# Patient Record
Sex: MALE | Race: WHITE | NOT HISPANIC OR LATINO | Employment: FULL TIME | URBAN - METROPOLITAN AREA
[De-identification: names, ages, dates, MRNs, and addresses within clinical notes are randomized per-mention and may not be internally consistent; named-entity substitution may affect disease eponyms.]

---

## 2018-01-17 ENCOUNTER — APPOINTMENT (OUTPATIENT)
Dept: URGENT CARE | Facility: CLINIC | Age: 43
End: 2018-01-17

## 2018-01-17 PROCEDURE — G0382 LEV 3 HOSP TYPE B ED VISIT: HCPCS

## 2018-01-22 ENCOUNTER — APPOINTMENT (OUTPATIENT)
Dept: URGENT CARE | Facility: CLINIC | Age: 43
End: 2018-01-22
Payer: OTHER MISCELLANEOUS

## 2018-01-22 PROCEDURE — G0382 LEV 3 HOSP TYPE B ED VISIT: HCPCS

## 2018-01-31 ENCOUNTER — APPOINTMENT (OUTPATIENT)
Dept: URGENT CARE | Facility: CLINIC | Age: 43
End: 2018-01-31
Payer: OTHER MISCELLANEOUS

## 2018-01-31 PROCEDURE — 99203 OFFICE O/P NEW LOW 30 MIN: CPT

## 2018-08-30 ENCOUNTER — APPOINTMENT (OUTPATIENT)
Dept: URGENT CARE | Facility: CLINIC | Age: 43
End: 2018-08-30
Payer: OTHER MISCELLANEOUS

## 2018-08-30 ENCOUNTER — APPOINTMENT (OUTPATIENT)
Dept: RADIOLOGY | Facility: CLINIC | Age: 43
End: 2018-08-30
Payer: OTHER MISCELLANEOUS

## 2018-08-30 ENCOUNTER — TRANSCRIBE ORDERS (OUTPATIENT)
Dept: URGENT CARE | Facility: CLINIC | Age: 43
End: 2018-08-30

## 2018-08-30 DIAGNOSIS — S69.92XA INJURY OF LEFT HAND, INITIAL ENCOUNTER: Primary | ICD-10-CM

## 2018-08-30 DIAGNOSIS — M79.642 HAND PAIN, LEFT: ICD-10-CM

## 2018-08-30 DIAGNOSIS — M79.642 HAND PAIN, LEFT: Primary | ICD-10-CM

## 2018-08-30 PROCEDURE — 99283 EMERGENCY DEPT VISIT LOW MDM: CPT | Performed by: EMERGENCY MEDICINE

## 2018-08-30 PROCEDURE — 73130 X-RAY EXAM OF HAND: CPT

## 2018-08-30 PROCEDURE — G0382 LEV 3 HOSP TYPE B ED VISIT: HCPCS | Performed by: EMERGENCY MEDICINE

## 2018-09-07 ENCOUNTER — APPOINTMENT (OUTPATIENT)
Dept: URGENT CARE | Facility: CLINIC | Age: 43
End: 2018-09-07
Payer: OTHER MISCELLANEOUS

## 2018-09-07 PROCEDURE — 99213 OFFICE O/P EST LOW 20 MIN: CPT | Performed by: EMERGENCY MEDICINE

## 2018-09-21 ENCOUNTER — APPOINTMENT (OUTPATIENT)
Dept: URGENT CARE | Facility: CLINIC | Age: 43
End: 2018-09-21
Payer: OTHER MISCELLANEOUS

## 2018-09-21 PROCEDURE — 99213 OFFICE O/P EST LOW 20 MIN: CPT | Performed by: EMERGENCY MEDICINE

## 2018-09-28 ENCOUNTER — APPOINTMENT (OUTPATIENT)
Dept: URGENT CARE | Facility: CLINIC | Age: 43
End: 2018-09-28
Payer: OTHER MISCELLANEOUS

## 2018-09-28 PROCEDURE — 99213 OFFICE O/P EST LOW 20 MIN: CPT | Performed by: EMERGENCY MEDICINE

## 2018-10-05 ENCOUNTER — APPOINTMENT (OUTPATIENT)
Dept: RADIOLOGY | Facility: CLINIC | Age: 43
End: 2018-10-05
Payer: OTHER MISCELLANEOUS

## 2018-10-05 VITALS
HEIGHT: 68 IN | SYSTOLIC BLOOD PRESSURE: 158 MMHG | DIASTOLIC BLOOD PRESSURE: 94 MMHG | HEART RATE: 59 BPM | BODY MASS INDEX: 34.86 KG/M2 | WEIGHT: 230 LBS

## 2018-10-05 DIAGNOSIS — M77.8 LEFT HAND TENDONITIS: Primary | ICD-10-CM

## 2018-10-05 DIAGNOSIS — M79.642 HAND PAIN, LEFT: ICD-10-CM

## 2018-10-05 PROCEDURE — 73130 X-RAY EXAM OF HAND: CPT

## 2018-10-05 PROCEDURE — 99203 OFFICE O/P NEW LOW 30 MIN: CPT | Performed by: ORTHOPAEDIC SURGERY

## 2018-10-05 NOTE — LETTER
October 5, 2018     Patient: Lina Zazueta   YOB: 1975   Date of Visit: 10/5/2018       To Whom it May Concern: Lina Zazueta is under my professional care  He was seen in my office on 10/5/2018  He may return to work with out limitations  If you have any questions or concerns, please don't hesitate to call           Sincerely,          Tiarra Gomes MD        CC: No Recipients

## 2018-10-05 NOTE — PROGRESS NOTES
CHIEF COMPLAINT:  Chief Complaint   Patient presents with    Left Hand - Pain       SUBJECTIVE:  Virgilio Jean is a 36y o  year old RHD male who presents to the office after sustaining an injury to his left hand when he it was smashed between the dump truck and tore per while at work on 08/31/2018  Patient states that his pain is on the volar aspect of his hand proximal to his ring and long fingers, as well as on the radial and ulnar aspects of the same fingers  Patient has been seeing amelia, who currently have him on a 5 lb weight restriction  Patient has been seeing physical therapy  Today patient states he has no pain at rest   Patient states he only pain with gripping and squeezing of objects with his left hand  Patient states he has the most pain after working all day  Patient is currently not taking any medications for pain patient has been attending formal therapy  Patient denies previous injury  Patient denies numbness and tingling  PAST MEDICAL HISTORY:  History reviewed  No pertinent past medical history  PAST SURGICAL HISTORY:  History reviewed  No pertinent surgical history  FAMILY HISTORY:  History reviewed  No pertinent family history  SOCIAL HISTORY:  Social History   Substance Use Topics    Smoking status: Former Smoker    Smokeless tobacco: Never Used      Comment: quit 9 yrs ago    Alcohol use No       MEDICATIONS:  No current outpatient prescriptions on file  ALLERGIES:  No Known Allergies    REVIEW OF SYSTEMS:  Review of Systems   Constitutional: Negative for chills, fever and unexpected weight change  HENT: Negative for hearing loss, nosebleeds and sore throat  Eyes: Negative for pain, redness and visual disturbance  Respiratory: Negative for cough, shortness of breath and wheezing  Cardiovascular: Negative for chest pain, palpitations and leg swelling  Gastrointestinal: Negative for abdominal pain, nausea and vomiting     Endocrine: Negative for polydipsia and polyuria  Genitourinary: Negative for dysuria and hematuria  Musculoskeletal: Negative for arthralgias, joint swelling and myalgias  Skin: Negative for rash and wound  Neurological: Negative for light-headedness, numbness and headaches  Psychiatric/Behavioral: Negative for decreased concentration, dysphoric mood and suicidal ideas  The patient is not nervous/anxious          VITALS:  Vitals:    10/05/18 1246   BP: 158/94   Pulse: 59       LABS:  HgA1c: No results found for: HGBA1C  BMP: No results found for: GLUCOSE, CALCIUM, NA, K, CO2, CL, BUN, CREATININE    _____________________________________________________  PHYSICAL EXAMINATION:  General: well developed and well nourished, alert, oriented times 3 and appears comfortable  Psychiatric: Normal  HEENT: Trachea Midline, No torticollis  Pulmonary: No audible wheezing or respiratory distress   Skin: No masses, erthema, lacerations, fluctation, ulcerations  Neurovascular: Sensation Intact to the Median, Ulnar, Radial Nerve, Motor Intact to the Median, Ulnar, Radial Nerve and Pulses Intact    MUSCULOSKELETAL EXAMINATION:    Left hand  No swelling, erythema, ecchymosis  Full composite fist without pain  Tender over A1 pulley of the left long and ring fingers  No clicking or locking or rng and long fingers  Radial and collateral ligamentous stable with testing and MP joint  ___________________________________________________  STUDIES REVIEWED:  I personally reviewed the following images of Three views left hand and my interpretation is No fracture dislocation or osseous abnormality      PROCEDURES PERFORMED:  Procedures  No Procedures performed today    _____________________________________________________  ASSESSMENT/PLAN:    Left hand long and ring finger flexor tendinitis without signs of triggering  * patient will discontinue therapy  * patient will take NSAIDs as needed  * Patient will apply heat in the morning for discomfort and   * patient was given a note to return to work without restrictions  * patient will call the office if he does not have improvement for re-evaluation and possible cortisone steroid injection        Follow Up:  Return if symptoms worsen or fail to improve        To Do Next Visit:  Re-evaluation of current issue        Scribe Attestation    I,:   Jluis Vaca am acting as a scribe while in the presence of the attending physician :        I,:   Ketan Kelley MD personally performed the services described in this documentation    as scribed in my presence :

## 2019-05-28 ENCOUNTER — APPOINTMENT (OUTPATIENT)
Dept: URGENT CARE | Facility: CLINIC | Age: 44
End: 2019-05-28
Payer: OTHER MISCELLANEOUS

## 2019-05-28 PROCEDURE — G0382 LEV 3 HOSP TYPE B ED VISIT: HCPCS

## 2019-05-28 PROCEDURE — 99283 EMERGENCY DEPT VISIT LOW MDM: CPT

## 2019-06-03 ENCOUNTER — APPOINTMENT (OUTPATIENT)
Dept: URGENT CARE | Facility: CLINIC | Age: 44
End: 2019-06-03
Payer: OTHER MISCELLANEOUS

## 2019-06-03 PROCEDURE — 99213 OFFICE O/P EST LOW 20 MIN: CPT | Performed by: PHYSICIAN ASSISTANT

## 2019-06-12 ENCOUNTER — APPOINTMENT (OUTPATIENT)
Dept: URGENT CARE | Facility: CLINIC | Age: 44
End: 2019-06-12
Payer: OTHER MISCELLANEOUS

## 2019-06-12 PROCEDURE — 99213 OFFICE O/P EST LOW 20 MIN: CPT | Performed by: PHYSICIAN ASSISTANT

## 2019-06-21 ENCOUNTER — APPOINTMENT (OUTPATIENT)
Dept: URGENT CARE | Facility: CLINIC | Age: 44
End: 2019-06-21
Payer: OTHER MISCELLANEOUS

## 2019-06-21 PROCEDURE — 99213 OFFICE O/P EST LOW 20 MIN: CPT | Performed by: EMERGENCY MEDICINE

## 2020-09-08 ENCOUNTER — HOSPITAL ENCOUNTER (EMERGENCY)
Facility: HOSPITAL | Age: 45
Discharge: HOME | End: 2020-09-08
Attending: EMERGENCY MEDICINE | Admitting: EMERGENCY MEDICINE
Payer: COMMERCIAL

## 2020-09-08 ENCOUNTER — APPOINTMENT (EMERGENCY)
Dept: ULTRASOUND IMAGING | Facility: HOSPITAL | Age: 45
End: 2020-09-08
Payer: COMMERCIAL

## 2020-09-08 ENCOUNTER — APPOINTMENT (EMERGENCY)
Dept: CT IMAGING | Facility: HOSPITAL | Age: 45
End: 2020-09-08
Payer: COMMERCIAL

## 2020-09-08 VITALS
SYSTOLIC BLOOD PRESSURE: 154 MMHG | OXYGEN SATURATION: 94 % | BODY MASS INDEX: 37.92 KG/M2 | DIASTOLIC BLOOD PRESSURE: 87 MMHG | RESPIRATION RATE: 18 BRPM | HEIGHT: 68 IN | WEIGHT: 250.22 LBS | TEMPERATURE: 98.4 F | HEART RATE: 65 BPM

## 2020-09-08 DIAGNOSIS — N20.1 URETEROLITHIASIS: Primary | ICD-10-CM

## 2020-09-08 DIAGNOSIS — N13.30 HYDRONEPHROSIS, RIGHT: ICD-10-CM

## 2020-09-08 LAB
ALBUMIN SERPL BCP-MCNC: 4.2 G/DL (ref 3.5–5)
ALP SERPL-CCNC: 95 U/L (ref 46–116)
ALT SERPL W P-5'-P-CCNC: 57 U/L (ref 12–78)
ANION GAP SERPL CALCULATED.3IONS-SCNC: 10 MMOL/L (ref 4–13)
APTT PPP: 26 SECONDS (ref 23–37)
AST SERPL W P-5'-P-CCNC: 36 U/L (ref 5–45)
BACTERIA UR QL AUTO: ABNORMAL /HPF
BASOPHILS # BLD AUTO: 0.02 THOUSANDS/ΜL (ref 0–0.1)
BASOPHILS NFR BLD AUTO: 0 % (ref 0–1)
BILIRUB SERPL-MCNC: 0.65 MG/DL (ref 0.2–1)
BILIRUB UR QL STRIP: ABNORMAL
BUN SERPL-MCNC: 17 MG/DL (ref 5–25)
CALCIUM SERPL-MCNC: 9 MG/DL (ref 8.3–10.1)
CHLORIDE SERPL-SCNC: 101 MMOL/L (ref 100–108)
CLARITY UR: ABNORMAL
CO2 SERPL-SCNC: 27 MMOL/L (ref 21–32)
COLOR UR: YELLOW
CREAT SERPL-MCNC: 1.38 MG/DL (ref 0.6–1.3)
EOSINOPHIL # BLD AUTO: 0.08 THOUSAND/ΜL (ref 0–0.61)
EOSINOPHIL NFR BLD AUTO: 1 % (ref 0–6)
ERYTHROCYTE [DISTWIDTH] IN BLOOD BY AUTOMATED COUNT: 12.5 % (ref 11.6–15.1)
GFR SERPL CREATININE-BSD FRML MDRD: 61 ML/MIN/1.73SQ M
GLUCOSE SERPL-MCNC: 139 MG/DL (ref 65–140)
GLUCOSE UR STRIP-MCNC: NEGATIVE MG/DL
HCT VFR BLD AUTO: 44.8 % (ref 36.5–49.3)
HGB BLD-MCNC: 15.4 G/DL (ref 12–17)
HGB UR QL STRIP.AUTO: NEGATIVE
IMM GRANULOCYTES # BLD AUTO: 0.03 THOUSAND/UL (ref 0–0.2)
IMM GRANULOCYTES NFR BLD AUTO: 0 % (ref 0–2)
INR PPP: 0.91 (ref 0.84–1.19)
KETONES UR STRIP-MCNC: NEGATIVE MG/DL
LEUKOCYTE ESTERASE UR QL STRIP: NEGATIVE
LIPASE SERPL-CCNC: 94 U/L (ref 73–393)
LYMPHOCYTES # BLD AUTO: 1.53 THOUSANDS/ΜL (ref 0.6–4.47)
LYMPHOCYTES NFR BLD AUTO: 13 % (ref 14–44)
MCH RBC QN AUTO: 29.5 PG (ref 26.8–34.3)
MCHC RBC AUTO-ENTMCNC: 34.4 G/DL (ref 31.4–37.4)
MCV RBC AUTO: 86 FL (ref 82–98)
MONOCYTES # BLD AUTO: 0.52 THOUSAND/ΜL (ref 0.17–1.22)
MONOCYTES NFR BLD AUTO: 4 % (ref 4–12)
NEUTROPHILS # BLD AUTO: 9.74 THOUSANDS/ΜL (ref 1.85–7.62)
NEUTS SEG NFR BLD AUTO: 82 % (ref 43–75)
NITRITE UR QL STRIP: NEGATIVE
NON-SQ EPI CELLS URNS QL MICRO: ABNORMAL /HPF
NRBC BLD AUTO-RTO: 0 /100 WBCS
PH UR STRIP.AUTO: 5 [PH]
PLATELET # BLD AUTO: 248 THOUSANDS/UL (ref 149–390)
PMV BLD AUTO: 9.6 FL (ref 8.9–12.7)
POTASSIUM SERPL-SCNC: 4.2 MMOL/L (ref 3.5–5.3)
PROT SERPL-MCNC: 8.2 G/DL (ref 6.4–8.2)
PROT UR STRIP-MCNC: ABNORMAL MG/DL
PROTHROMBIN TIME: 12.1 SECONDS (ref 11.6–14.5)
RBC # BLD AUTO: 5.22 MILLION/UL (ref 3.88–5.62)
RBC #/AREA URNS AUTO: ABNORMAL /HPF
SODIUM SERPL-SCNC: 138 MMOL/L (ref 136–145)
SP GR UR STRIP.AUTO: >=1.03 (ref 1–1.03)
UROBILINOGEN UR QL STRIP.AUTO: 0.2 E.U./DL
WBC # BLD AUTO: 11.92 THOUSAND/UL (ref 4.31–10.16)
WBC #/AREA URNS AUTO: ABNORMAL /HPF

## 2020-09-08 PROCEDURE — 96361 HYDRATE IV INFUSION ADD-ON: CPT

## 2020-09-08 PROCEDURE — 83690 ASSAY OF LIPASE: CPT | Performed by: EMERGENCY MEDICINE

## 2020-09-08 PROCEDURE — 85025 COMPLETE CBC W/AUTO DIFF WBC: CPT | Performed by: EMERGENCY MEDICINE

## 2020-09-08 PROCEDURE — 99284 EMERGENCY DEPT VISIT MOD MDM: CPT | Performed by: EMERGENCY MEDICINE

## 2020-09-08 PROCEDURE — 76870 US EXAM SCROTUM: CPT

## 2020-09-08 PROCEDURE — 99284 EMERGENCY DEPT VISIT MOD MDM: CPT

## 2020-09-08 PROCEDURE — 85610 PROTHROMBIN TIME: CPT | Performed by: EMERGENCY MEDICINE

## 2020-09-08 PROCEDURE — 81001 URINALYSIS AUTO W/SCOPE: CPT | Performed by: EMERGENCY MEDICINE

## 2020-09-08 PROCEDURE — 36415 COLL VENOUS BLD VENIPUNCTURE: CPT | Performed by: EMERGENCY MEDICINE

## 2020-09-08 PROCEDURE — G1004 CDSM NDSC: HCPCS

## 2020-09-08 PROCEDURE — 80053 COMPREHEN METABOLIC PANEL: CPT | Performed by: EMERGENCY MEDICINE

## 2020-09-08 PROCEDURE — 96374 THER/PROPH/DIAG INJ IV PUSH: CPT

## 2020-09-08 PROCEDURE — 74177 CT ABD & PELVIS W/CONTRAST: CPT

## 2020-09-08 PROCEDURE — 85730 THROMBOPLASTIN TIME PARTIAL: CPT | Performed by: EMERGENCY MEDICINE

## 2020-09-08 RX ORDER — TAMSULOSIN HYDROCHLORIDE 0.4 MG/1
0.4 CAPSULE ORAL ONCE
Status: COMPLETED | OUTPATIENT
Start: 2020-09-08 | End: 2020-09-08

## 2020-09-08 RX ORDER — TAMSULOSIN HYDROCHLORIDE 0.4 MG/1
0.4 CAPSULE ORAL
Qty: 5 CAPSULE | Refills: 0 | Status: SHIPPED | OUTPATIENT
Start: 2020-09-08 | End: 2020-09-13

## 2020-09-08 RX ORDER — CIPROFLOXACIN 500 MG/1
500 TABLET, FILM COATED ORAL 2 TIMES DAILY
Qty: 10 TABLET | Refills: 0 | Status: SHIPPED | OUTPATIENT
Start: 2020-09-08 | End: 2020-09-13

## 2020-09-08 RX ORDER — ONDANSETRON 2 MG/ML
4 INJECTION INTRAMUSCULAR; INTRAVENOUS ONCE
Status: COMPLETED | OUTPATIENT
Start: 2020-09-08 | End: 2020-09-08

## 2020-09-08 RX ORDER — CIPROFLOXACIN 500 MG/1
500 TABLET, FILM COATED ORAL ONCE
Status: COMPLETED | OUTPATIENT
Start: 2020-09-08 | End: 2020-09-08

## 2020-09-08 RX ORDER — ONDANSETRON 4 MG/1
4 TABLET, FILM COATED ORAL EVERY 6 HOURS
Qty: 12 TABLET | Refills: 0 | Status: SHIPPED | OUTPATIENT
Start: 2020-09-08

## 2020-09-08 RX ADMIN — CIPROFLOXACIN HYDROCHLORIDE 500 MG: 500 TABLET, FILM COATED ORAL at 11:20

## 2020-09-08 RX ADMIN — SODIUM CHLORIDE 1000 ML: 0.9 INJECTION, SOLUTION INTRAVENOUS at 09:57

## 2020-09-08 RX ADMIN — IOHEXOL 100 ML: 350 INJECTION, SOLUTION INTRAVENOUS at 10:38

## 2020-09-08 RX ADMIN — ONDANSETRON 4 MG: 2 INJECTION INTRAMUSCULAR; INTRAVENOUS at 11:14

## 2020-09-08 RX ADMIN — TAMSULOSIN HYDROCHLORIDE 0.4 MG: 0.4 CAPSULE ORAL at 11:21

## 2020-09-08 NOTE — ED PROVIDER NOTES
History  Chief Complaint   Patient presents with    Abdominal Pain     pt c/o right lower abdominal pain upon awakening at 0530 today w/episode of vomiting  pt stated pain started in right groin and migrated to RLQ  pt took ibuprofen w/o relief  denies travel/sob/cough/fever/d     Patient is a 59-year-old male with no past medical or surgical history presents emergency department complaining of right lower quadrant abdominal pain which initially started in the right testicle and scrotum radiating up into the right lower quadrant of the abdomen started this morning still present now improving when severe the patient had associated nausea and vomiting  No difficulty urinating or blood in the urine although patient does feel urge to urinate now  No diarrhea no constipation no fevers or chills  History provided by:  Patient  Abdominal Pain   Pain location:  RLQ  Pain quality: aching and cramping    Pain radiates to:  Scrotum  Pain severity:  Moderate  Onset quality:  Sudden  Duration:  1 day  Timing:  Constant  Progression:  Improving  Chronicity:  New  Associated symptoms: nausea and vomiting    Associated symptoms: no chest pain, no chills, no constipation, no cough, no diarrhea, no dysuria, no fatigue, no fever, no hematuria, no shortness of breath and no sore throat        None       History reviewed  No pertinent past medical history  History reviewed  No pertinent surgical history  History reviewed  No pertinent family history  I have reviewed and agree with the history as documented  E-Cigarette/Vaping    E-Cigarette Use Never User      E-Cigarette/Vaping Substances     Social History     Tobacco Use    Smoking status: Former Smoker    Smokeless tobacco: Never Used    Tobacco comment: quit 9 yrs ago   Substance Use Topics    Alcohol use: No    Drug use: No       Review of Systems   Constitutional: Negative for activity change, appetite change, chills, fatigue and fever     HENT: Negative for congestion, ear pain, rhinorrhea and sore throat  Eyes: Negative for discharge, redness and visual disturbance  Respiratory: Negative for cough, chest tightness, shortness of breath and wheezing  Cardiovascular: Negative for chest pain and palpitations  Gastrointestinal: Positive for abdominal pain, nausea and vomiting  Negative for constipation and diarrhea  Endocrine: Negative for polydipsia and polyuria  Genitourinary: Positive for testicular pain and urgency  Negative for difficulty urinating, dysuria, frequency and hematuria  Musculoskeletal: Negative for arthralgias and myalgias  Skin: Negative for color change, pallor and rash  Neurological: Negative for dizziness, weakness, light-headedness, numbness and headaches  Hematological: Negative for adenopathy  Does not bruise/bleed easily  All other systems reviewed and are negative  Physical Exam  Physical Exam  Vitals signs and nursing note reviewed  Constitutional:       Appearance: He is well-developed  HENT:      Head: Normocephalic and atraumatic  Right Ear: External ear normal       Left Ear: External ear normal       Nose: Nose normal    Eyes:      Conjunctiva/sclera: Conjunctivae normal       Pupils: Pupils are equal, round, and reactive to light  Neck:      Musculoskeletal: Normal range of motion and neck supple  Cardiovascular:      Rate and Rhythm: Normal rate and regular rhythm  Heart sounds: Normal heart sounds  Pulmonary:      Effort: Pulmonary effort is normal  No respiratory distress  Breath sounds: Normal breath sounds  No wheezing or rales  Chest:      Chest wall: No tenderness  Abdominal:      General: Bowel sounds are normal  There is no distension  Palpations: Abdomen is soft  Tenderness: There is abdominal tenderness in the right lower quadrant and suprapubic area  There is no right CVA tenderness, left CVA tenderness, guarding or rebound        Hernia: No hernia is present  Genitourinary:     Scrotum/Testes:         Right: Tenderness present  Mass or swelling not present  Left: Mass or swelling not present  Musculoskeletal: Normal range of motion  Skin:     General: Skin is warm and dry  Neurological:      Mental Status: He is alert and oriented to person, place, and time  Cranial Nerves: No cranial nerve deficit  Sensory: No sensory deficit           Vital Signs  ED Triage Vitals [09/08/20 0931]   Temperature Pulse Respirations Blood Pressure SpO2   (!) 97 2 °F (36 2 °C) 60 18 162/99 95 %      Temp Source Heart Rate Source Patient Position - Orthostatic VS BP Location FiO2 (%)   Temporal Monitor Lying Right arm --      Pain Score       6           Vitals:    09/08/20 0931   BP: 162/99   Pulse: 60   Patient Position - Orthostatic VS: Lying         Visual Acuity      ED Medications  Medications   ciprofloxacin (CIPRO) tablet 500 mg (has no administration in time range)   tamsulosin (FLOMAX) capsule 0 4 mg (has no administration in time range)   sodium chloride 0 9 % bolus 1,000 mL (1,000 mL Intravenous New Bag 9/8/20 0957)   iohexol (OMNIPAQUE) 350 MG/ML injection (MULTI-DOSE) 100 mL (100 mL Intravenous Given 9/8/20 1038)       Diagnostic Studies  Results Reviewed     Procedure Component Value Units Date/Time    Urine Microscopic [[id number]]  (Abnormal) Collected:  09/08/20 0957    Lab Status:  Final result Specimen:  Urine, Clean Catch Updated:  09/08/20 1026     RBC, UA None Seen /hpf      WBC, UA None Seen /hpf      Epithelial Cells None Seen /hpf      Bacteria, UA Innumerable /hpf     Narrative:       Concentrated microscopic on low volume urine     Protime-INR [[id number]]  (Normal) Collected:  09/08/20 0953    Lab Status:  Final result Specimen:  Blood from Arm, Right Updated:  09/08/20 1019     Protime 12 1 seconds      INR 0 91    APTT [[id number]]  (Normal) Collected:  09/08/20 0953    Lab Status:  Final result Specimen:  Blood from Arm, Right Updated:  09/08/20 1019     PTT 26 seconds     Lipase [[id number]]  (Normal) Collected:  09/08/20 0953    Lab Status:  Final result Specimen:  Blood from Arm, Right Updated:  09/08/20 1017     Lipase 94 u/L     Comprehensive metabolic panel [[id number]]  (Abnormal) Collected:  09/08/20 0953    Lab Status:  Final result Specimen:  Blood from Arm, Right Updated:  09/08/20 1017     Sodium 138 mmol/L      Potassium 4 2 mmol/L      Chloride 101 mmol/L      CO2 27 mmol/L      ANION GAP 10 mmol/L      BUN 17 mg/dL      Creatinine 1 38 mg/dL      Glucose 139 mg/dL      Calcium 9 0 mg/dL      AST 36 U/L      ALT 57 U/L      Alkaline Phosphatase 95 U/L      Total Protein 8 2 g/dL      Albumin 4 2 g/dL      Total Bilirubin 0 65 mg/dL      eGFR 61 ml/min/1 73sq m     Narrative:       National Kidney Disease Foundation guidelines for Chronic Kidney Disease (CKD):     Stage 1 with normal or high GFR (GFR > 90 mL/min/1 73 square meters)    Stage 2 Mild CKD (GFR = 60-89 mL/min/1 73 square meters)    Stage 3A Moderate CKD (GFR = 45-59 mL/min/1 73 square meters)    Stage 3B Moderate CKD (GFR = 30-44 mL/min/1 73 square meters)    Stage 4 Severe CKD (GFR = 15-29 mL/min/1 73 square meters)    Stage 5 End Stage CKD (GFR <15 mL/min/1 73 square meters)  Note: GFR calculation is accurate only with a steady state creatinine    UA w Reflex to Microscopic w Reflex to Culture [[id number]]  (Abnormal) Collected:  09/08/20 0957    Lab Status:  Final result Specimen:  Urine, Clean Catch Updated:  09/08/20 1006     Color, UA Yellow     Clarity, UA Turbid     Specific Gravity, UA >=1 030     pH, UA 5 0     Leukocytes, UA Negative     Nitrite, UA Negative     Protein, UA Trace mg/dl      Glucose, UA Negative mg/dl      Ketones, UA Negative mg/dl      Urobilinogen, UA 0 2 E U /dl      Bilirubin, UA Small     Blood, UA Negative    CBC and differential [[id number]]  (Abnormal) Collected:  09/08/20 0953    Lab Status:  Final result Specimen:  Blood from Arm, Right Updated:  09/08/20 1005     WBC 11 92 Thousand/uL      RBC 5 22 Million/uL      Hemoglobin 15 4 g/dL      Hematocrit 44 8 %      MCV 86 fL      MCH 29 5 pg      MCHC 34 4 g/dL      RDW 12 5 %      MPV 9 6 fL      Platelets 955 Thousands/uL      nRBC 0 /100 WBCs      Neutrophils Relative 82 %      Immat GRANS % 0 %      Lymphocytes Relative 13 %      Monocytes Relative 4 %      Eosinophils Relative 1 %      Basophils Relative 0 %      Neutrophils Absolute 9 74 Thousands/µL      Immature Grans Absolute 0 03 Thousand/uL      Lymphocytes Absolute 1 53 Thousands/µL      Monocytes Absolute 0 52 Thousand/µL      Eosinophils Absolute 0 08 Thousand/µL      Basophils Absolute 0 02 Thousands/µL                  CT abdomen pelvis with contrast   Final Result by Pascual Price MD (09/08 1055)      Delayed enhancement of the right kidney with mild to moderate hydroureteronephrosis resulting from a 2 mm obstructing calculus at the right UV junction  Minimal perinephric stranding  Fatty liver              Workstation performed: [id number]         US scrotum and testicles   Final Result by  (09/08 1107)   Addendum 1 of 1 by Lisset Covarrubias MD (09/08 1035)   ADDENDUM:      INDICATION: Right testicular pain radiating to abdomen      Final                 Procedures  Procedures         ED Course                                       MDM  Number of Diagnoses or Management Options  Hydronephrosis, right: new and requires workup  Ureterolithiasis: new and requires workup  Diagnosis management comments: Patient remained clinically and hemodynamically stable in the emergency department he is afebrile nontoxic well-appearing and pain is much improved since onset and patient wants nothing for pain in the emergency department symptoms are well controlled he was given IV fluids in the ED will treat with Cipro and Flomax for now for ureterolithiasis and hydronephrosis and advised prompt follow-up with urologist for further evaluation treatment and obtain test results return precautions and anticipatory guidance discussed  Amount and/or Complexity of Data Reviewed  Clinical lab tests: ordered and reviewed  Tests in the radiology section of CPT®: ordered and reviewed  Tests in the medicine section of CPT®: ordered and reviewed  Decide to obtain previous medical records or to obtain history from someone other than the patient: yes  Review and summarize past medical records: yes  Independent visualization of images, tracings, or specimens: yes    Risk of Complications, Morbidity, and/or Mortality  Presenting problems: moderate  Diagnostic procedures: moderate  Management options: moderate    Patient Progress  Patient progress: stable      Disposition  Final diagnoses:   Ureterolithiasis - 2 mm calculus right UVJ   Hydronephrosis, right     Time reflects when diagnosis was documented in both MDM as applicable and the Disposition within this note     Time User Action Codes Description Comment    9/8/2020 11:00 AM Zoltan Thomas Add [N20 1] Ureterolithiasis     9/8/2020 11:00 AM Zoltan Miramontes Modify [N20 1] Ureterolithiasis 2 mm calculus right UVJ    9/8/2020 11:00 AM Zoltan Miramontes Add [N13 30] Hydronephrosis, right       ED Disposition     ED Disposition Condition Date/Time Comment    Discharge Stable Tue Sep 8, 2020 10:59 AM Chioma Lucero discharge to home/self care              Follow-up Information     Follow up With Specialties Details Why [address] [facility], Physician Assistant Schedule an appointment as soon as possible for a visit in 2 days  [facility] Schedule an appointment as soon as possible for a visit in 2 days  [address] [place] [address]  [phone]            Patient's Medications   Discharge Prescriptions    CIPROFLOXACIN (CIPRO) 500 MG TABLET    Take 1 tablet (500 mg total) by mouth 2 (two) times a day for 5 days       Start Date: 9/8/2020  End Date: 9/13/2020       Order Dose: 500 mg       Quantity: 10 tablet    Refills: 0    TAMSULOSIN (FLOMAX) 0 4 MG    Take 1 capsule (0 4 mg total) by mouth daily with dinner for 5 days       Start Date: 9/8/2020  End Date: 9/13/2020       Order Dose: 0 4 mg       Quantity: 5 capsule    Refills: 0         PDMP Review     None          ED Provider  Electronically Signed by           Cathleen Bess DO  09/08/20 1104

## 2020-09-10 ENCOUNTER — TELEPHONE (OUTPATIENT)
Dept: CARDIOLOGY CLINIC | Facility: CLINIC | Age: 45
End: 2020-09-10

## 2020-09-10 NOTE — TELEPHONE ENCOUNTER
Pt returned call to office to schedule NP appt with Uro for Kidney stones  I did not see any openings until January  Pt states he's in a lot of pain right now  Please advise

## 2020-09-11 ENCOUNTER — TELEPHONE (OUTPATIENT)
Dept: UROLOGY | Facility: MEDICAL CENTER | Age: 45
End: 2020-09-11

## 2020-09-11 NOTE — TELEPHONE ENCOUNTER
Patient scheduled for appt Thurs, 9/17/2020, at 9 AM at [facility] location  This was okayed by Dr Aurea Emerson  Left message with patient re time, date and location

## 2020-09-15 ENCOUNTER — TELEPHONE (OUTPATIENT)
Dept: UROLOGY | Facility: MEDICAL CENTER | Age: 45
End: 2020-09-15

## 2020-09-15 NOTE — TELEPHONE ENCOUNTER
Patient called in regards to message he received  Patient states he is unable to make am appointment  Patient can only get in after 2 pm   Please advise

## 2020-09-15 NOTE — TELEPHONE ENCOUNTER
Yudelka Macario  I have a new patient scheduled at [facility] in the AM that can only do an after 2 PM appt  Could I maybe try to move someone from the afternoon to the morning opening and then stick him in a 15 min slot in the afternoon  This is the patient I had asked you about last week  Had studies at [facility] last week  Please advise    I do not want to over schedule you on Thursday

## 2020-09-15 NOTE — TELEPHONE ENCOUNTER
He has only a 2 mm stone- can be seen the following weeks, or in [place]- don't need to schedule him for this Thursday

## 2020-09-15 NOTE — TELEPHONE ENCOUNTER
Please advise how to proceed  First opening in [place] not until 12/04 with Dr Marylen Herbert  Please advise if acceptable

## 2020-09-15 NOTE — TELEPHONE ENCOUNTER
If you want to call Sharon Del Cid said he can be seen at either [facility], [facility]  or [facility] in the next few weeks  Said stone is small, no need to squeeze him in this Thursday        Appreciate your help

## 2020-09-15 NOTE — TELEPHONE ENCOUNTER
Sent message to Dr Aurea Emerson regarding moving patients around to fit him in in the afternoon  Will await his answer  I know Dr Aurea Emerson is full on Thursday at [facility]    If you want, I can call the patient once I get an answer from Dr Aurea Emerson

## 2020-09-17 NOTE — TELEPHONE ENCOUNTER
Called and left message for patient  When patient calls back, please confirm appointment for 10/27/20 at 3:00pm with Julio C Tejada

## 2020-09-18 NOTE — TELEPHONE ENCOUNTER
Called and spoke with patient  Patient stated that he passed the stone on Friday and does not want to follow up with our office right now

## 2021-09-28 ENCOUNTER — OCCMED (OUTPATIENT)
Dept: URGENT CARE | Facility: CLINIC | Age: 46
End: 2021-09-28
Payer: OTHER MISCELLANEOUS

## 2021-09-28 ENCOUNTER — APPOINTMENT (OUTPATIENT)
Dept: RADIOLOGY | Facility: CLINIC | Age: 46
End: 2021-09-28
Payer: COMMERCIAL

## 2021-09-28 DIAGNOSIS — S76.011A STRAIN OF RIGHT HIP ADDUCTOR MUSCLE, INITIAL ENCOUNTER: ICD-10-CM

## 2021-09-28 DIAGNOSIS — Z02.6 ENCOUNTER RELATED TO WORKER'S COMPENSATION CLAIM: Primary | ICD-10-CM

## 2021-09-28 PROCEDURE — 72170 X-RAY EXAM OF PELVIS: CPT

## 2021-09-28 PROCEDURE — G0382 LEV 3 HOSP TYPE B ED VISIT: HCPCS | Performed by: PHYSICIAN ASSISTANT

## 2021-09-28 PROCEDURE — 99283 EMERGENCY DEPT VISIT LOW MDM: CPT | Performed by: PHYSICIAN ASSISTANT

## 2021-10-12 ENCOUNTER — OCCMED (OUTPATIENT)
Dept: URGENT CARE | Facility: CLINIC | Age: 46
End: 2021-10-12
Payer: OTHER MISCELLANEOUS

## 2021-10-12 DIAGNOSIS — Z02.6 ENCOUNTER RELATED TO WORKER'S COMPENSATION CLAIM: Primary | ICD-10-CM

## 2021-10-12 DIAGNOSIS — S76.011D STRAIN OF RIGHT HIP ADDUCTOR MUSCLE, SUBSEQUENT ENCOUNTER: ICD-10-CM

## 2021-10-12 PROCEDURE — 99213 OFFICE O/P EST LOW 20 MIN: CPT | Performed by: PHYSICIAN ASSISTANT

## 2021-10-26 ENCOUNTER — OCCMED (OUTPATIENT)
Dept: URGENT CARE | Facility: CLINIC | Age: 46
End: 2021-10-26
Payer: COMMERCIAL

## 2021-10-26 DIAGNOSIS — S76.212D: ICD-10-CM
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
## 2021-10-26 PROCEDURE — 99213 OFFICE O/P EST LOW 20 MIN: CPT

## 2023-02-22 ENCOUNTER — OFFICE VISIT (OUTPATIENT)
Dept: PODIATRY | Age: 48
End: 2023-02-22

## 2023-02-22 ENCOUNTER — APPOINTMENT (OUTPATIENT)
Dept: LAB | Facility: HOSPITAL | Age: 48
End: 2023-02-22

## 2023-02-22 ENCOUNTER — HOSPITAL ENCOUNTER (OUTPATIENT)
Dept: RADIOLOGY | Facility: CLINIC | Age: 48
Discharge: HOME | End: 2023-02-22

## 2023-02-22 VITALS — WEIGHT: 252.4 LBS | BODY MASS INDEX: 38.25 KG/M2 | HEIGHT: 68 IN

## 2023-02-22 DIAGNOSIS — M79.675 GREAT TOE PAIN, LEFT: Primary | ICD-10-CM

## 2023-02-22 DIAGNOSIS — M10.9 ACUTE GOUT INVOLVING TOE OF LEFT FOOT, UNSPECIFIED CAUSE: ICD-10-CM

## 2023-02-22 DIAGNOSIS — M79.675 GREAT TOE PAIN, LEFT: ICD-10-CM

## 2023-02-22 LAB
ALBUMIN SERPL BCP-MCNC: 4.5 G/DL (ref 3.5–5)
ALP SERPL-CCNC: 80 U/L (ref 34–104)
ALT SERPL W P-5'-P-CCNC: 34 U/L (ref 7–52)
ANION GAP SERPL CALCULATED.3IONS-SCNC: 5 MMOL/L (ref 4–13)
AST SERPL W P-5'-P-CCNC: 27 U/L (ref 13–39)
BILIRUB SERPL-MCNC: 0.48 MG/DL (ref 0.2–1)
BUN SERPL-MCNC: 9 MG/DL (ref 5–25)
CALCIUM SERPL-MCNC: 9.4 MG/DL (ref 8.4–10.2)
CHLORIDE SERPL-SCNC: 106 MMOL/L (ref 96–108)
CO2 SERPL-SCNC: 30 MMOL/L (ref 21–32)
CREAT SERPL-MCNC: 0.84 MG/DL (ref 0.6–1.3)
GFR SERPL CREATININE-BSD FRML MDRD: 103 ML/MIN/1.73SQ M
GLUCOSE P FAST SERPL-MCNC: 84 MG/DL (ref 65–99)
POTASSIUM SERPL-SCNC: 4.3 MMOL/L (ref 3.5–5.3)
PROT SERPL-MCNC: 7.8 G/DL (ref 6.4–8.4)
SODIUM SERPL-SCNC: 141 MMOL/L (ref 135–147)
URATE SERPL-MCNC: 6.3 MG/DL (ref 3.5–8.5)

## 2023-02-22 RX ORDER — COLCHICINE 0.6 MG/1
TABLET ORAL
Qty: 6 TABLET | Refills: 0 | Status: SHIPPED | OUTPATIENT
Start: 2023-02-22 | End: 2023-03-01 | Stop reason: ALTCHOICE

## 2023-02-22 NOTE — PROGRESS NOTES
Assessment/Plan:      Diagnoses and all orders for this visit:    Great toe pain, left  -     XR toe left great min 2 views; Future  -     Uric acid; Future  -     Comprehensive metabolic panel; Future  -     Ambulatory referral to [facility]; Future  -     colchicine (COLCRYS) 0 6 mg tablet; Day 1: 1 2mg (2 tablets) by mouth as initial dose then 2 hours later, take 0 6 mg (one tablet) by mouth  Day 2: 0 6 mg by mouth every 4 hours until pain resolves  Stop if nausea or diarrhea occur  Acute gout involving toe of left foot, unspecified cause  -     colchicine (COLCRYS) 0 6 mg tablet; Day 1: 1 2mg (2 tablets) by mouth as initial dose then 2 hours later, take 0 6 mg (one tablet) by mouth  Day 2: 0 6 mg by mouth every 4 hours until pain resolves  Stop if nausea or diarrhea occur  Imaging Reviewed at this visit (I personally reviewed/independently interpreted images and reports in PACS)  · XR left toe WB 2v 2/22/23: no acute osseous abnormality noted  IMPRESSION:  · Left 1st MTPJ pain, swelling and erythema - likely acute gout attack     PLAN:  · I reviewed clinical exam and radiographic imaging (XR) with patient in detail today  I have discussed with the patient the pathophysiology of this diagnosis and reviewed how the examination correlates with this diagnosis  · I discussed treatment options for gout including joint injection, NSAID, steroid, colchicine  Patient would like to attempt colchicine and if no improvement, will consider injection  · I rx'd colchicine today w/ very specific instructions  He is to stop if he gets GI upset  He may take NSAIDs after done with this Rx  · I rec supportive shoes with soft top at all times  · I gave handout for low purine diet  · CMP, uric acid ordered  · I gave referral to PCP as patient does not have one (to discuss possible allopurinol)  · F/u one week for recheck; injection if no improvement       Subjective:      Patient ID: Mihir Mcgill is a 49 y o  male     Viviane Acevedo presents to clinic today for pain, redness and swelling to left 1st toe joint  Notes this started last week however he has had once episode prior a few months ago which self-resolved  Notes current symptoms have not resolved with NSAIDs  He does not have a current PCP and is interested in referral       The following portions of the patient's history were reviewed and updated as appropriate: allergies, current medications, past family history, past medical history, past social history, past surgical history and problem list     Review of Systems   Constitutional: Negative for activity change, chills and fever  HENT: Negative  Respiratory: Negative for cough, chest tightness and shortness of breath  Cardiovascular: Negative for chest pain and leg swelling  Endocrine: Negative  Genitourinary: Negative  Musculoskeletal: Positive for arthralgias (Left 1st MTPJ)  Skin: Positive for color change (L medial 1st MTPJ erythema)  Neurological: Negative  Negative for numbness  Psychiatric/Behavioral: Negative  Negative for agitation and behavioral problems  Objective:      Ht 5' 8" (1 727 m)   Wt 114 kg (252 lb 6 4 oz)   BMI 38 38 kg/m²          Physical Exam  Constitutional:       General: He is not in acute distress  Appearance: Normal appearance  He is not ill-appearing  Cardiovascular:      Pulses: Normal pulses  Comments: Bilateral DP & PT pulses 2/4  CRT WNL  Pedal hair present  Feet warm and well perfused  Pulmonary:      Effort: No respiratory distress  Musculoskeletal:         General: Swelling (Left 1st MTPJ) and tenderness (Left medial 1st MTPJ) present  Comments: Bilateral ankle, STJ, TNJ, TMTJ ROM WNL and without pain  LE muscle strength WNL  Pain with ROM Left 1st MTPJ   Skin:     General: Skin is warm  Capillary Refill: Capillary refill takes less than 2 seconds  Findings: No erythema or lesion     Neurological:      General: No focal deficit present  Mental Status: He is alert and oriented to person, place, and time  Sensory: No sensory deficit  Comments: Gross sensation intact  Denies N/T/B to B/L feet      Psychiatric:         Mood and Affect: Mood normal          Behavior: Behavior normal

## 2023-02-22 NOTE — PATIENT INSTRUCTIONS
Take colchicine as instructed  Stop if you get diarrhea or stomach upset  May take NSAID after done with colchicine rx  F/u with PCP (referral made)

## 2023-03-01 ENCOUNTER — OFFICE VISIT (OUTPATIENT)
Dept: PODIATRY | Age: 48
End: 2023-03-01

## 2023-03-01 VITALS
SYSTOLIC BLOOD PRESSURE: 148 MMHG | BODY MASS INDEX: 38.19 KG/M2 | HEIGHT: 68 IN | WEIGHT: 252 LBS | DIASTOLIC BLOOD PRESSURE: 88 MMHG

## 2023-03-01 DIAGNOSIS — M79.675 GREAT TOE PAIN, LEFT: Primary | ICD-10-CM

## 2023-03-01 DIAGNOSIS — M10.9 ACUTE GOUT INVOLVING TOE OF LEFT FOOT, UNSPECIFIED CAUSE: ICD-10-CM

## 2023-03-01 RX ORDER — METHYLPREDNISOLONE 4 MG/1
TABLET ORAL
Qty: 1 EACH | Refills: 0 | Status: SHIPPED | OUTPATIENT
Start: 2023-03-01

## 2023-03-01 NOTE — PROGRESS NOTES
Assessment/Plan:      Diagnoses and all orders for this visit:    Great toe pain, left  -     methylPREDNISolone 4 MG tablet therapy pack; Use as directed on package    Acute gout involving toe of left foot, unspecified cause  -     methylPREDNISolone 4 MG tablet therapy pack; Use as directed on package          IMPRESSION:  · Left 1st MTPJ pain, swelling and erythema - likely acute gout attack     PLAN:  · Pain improved however not resolved s/ colchicine  · I discussed treatment options for gout including joint injection, NSAID, steroid po  Patient would like to attempt medrol dose pack and if no improvement, will consider injection  · I rx'd medrol dose pack today as he would still liketo avoid injection  · CMP, uric acid reviewed   · Unna boot applied to assist with edema reduction  · PCP appt 3/6/23  · F/u one week for recheck; injection if no improvement       Subjective:      Patient ID: Martha Forrest is a 49 y o  male  Nelly Blackwell presents to clinic today for f/u pain, redness and swelling to left 1st toe joint  Notes the colchicine did help but pain still present  Notes decreased swelling and redness as well but still mildly present  Has PCP appt 3/6/23  Notes he did eat a few boxes of slim jims prior to this episode of foot pain  The following portions of the patient's history were reviewed and updated as appropriate: allergies, current medications, past family history, past medical history, past social history, past surgical history and problem list     Review of Systems   Constitutional: Negative for activity change, chills and fever  HENT: Negative  Respiratory: Negative for cough, chest tightness and shortness of breath  Cardiovascular: Negative for chest pain and leg swelling  Endocrine: Negative  Genitourinary: Negative  Musculoskeletal: Positive for arthralgias (Left 1st MTPJ)  Skin: Positive for color change (L medial 1st MTPJ erythema)  Neurological: Negative    Negative for numbness  Psychiatric/Behavioral: Negative  Negative for agitation and behavioral problems  Objective:      /88 (BP Location: Left arm, Patient Position: Sitting, Cuff Size: Large)   Ht 5' 8" (1 727 m)   Wt 114 kg (252 lb)   BMI 38 32 kg/m²          Physical Exam  Constitutional:       General: He is not in acute distress  Appearance: Normal appearance  He is not ill-appearing  Cardiovascular:      Pulses: Normal pulses  Comments: Bilateral DP & PT pulses 2/4  CRT WNL  Pedal hair present  Feet warm and well perfused  Pulmonary:      Effort: No respiratory distress  Musculoskeletal:         General: Swelling (Left 1st MTPJ) and tenderness (Left medial 1st MTPJ) present  Comments: Bilateral ankle, STJ, TNJ, TMTJ ROM WNL and without pain  LE muscle strength WNL  Pain with ROM Left 1st MTPJ   Skin:     General: Skin is warm  Capillary Refill: Capillary refill takes less than 2 seconds  Findings: No erythema or lesion  Neurological:      General: No focal deficit present  Mental Status: He is alert and oriented to person, place, and time  Sensory: No sensory deficit  Comments: Gross sensation intact  Denies N/T/B to B/L feet      Psychiatric:         Mood and Affect: Mood normal          Behavior: Behavior normal

## 2023-03-06 ENCOUNTER — OFFICE VISIT (OUTPATIENT)
Dept: FAMILY MEDICINE CLINIC | Facility: CLINIC | Age: 48
End: 2023-03-06

## 2023-03-06 VITALS
SYSTOLIC BLOOD PRESSURE: 130 MMHG | WEIGHT: 246.4 LBS | TEMPERATURE: 98.2 F | OXYGEN SATURATION: 96 % | DIASTOLIC BLOOD PRESSURE: 84 MMHG | BODY MASS INDEX: 37.35 KG/M2 | HEART RATE: 74 BPM | HEIGHT: 68 IN

## 2023-03-06 DIAGNOSIS — Z80.0 FAMILY HISTORY OF COLON CANCER: ICD-10-CM

## 2023-03-06 DIAGNOSIS — Z12.11 SCREENING FOR COLON CANCER: ICD-10-CM

## 2023-03-06 DIAGNOSIS — M10.9 ACUTE GOUT INVOLVING TOE OF LEFT FOOT, UNSPECIFIED CAUSE: ICD-10-CM

## 2023-03-06 DIAGNOSIS — Z00.00 ANNUAL PHYSICAL EXAM: Primary | ICD-10-CM

## 2023-03-06 NOTE — PROGRESS NOTES
ADULT ANNUAL [facility] [address] [place] [address] PRIMARY CARE    NAME: Ean Gresham  AGE: 49 y o  SEX: male  : 1975     DATE: 3/6/2023     Assessment and Plan:     Problem List Items Addressed This Visit    None  Visit Diagnoses     Annual physical exam    -  Primary    Acute gout involving toe of left foot, unspecified cause        Screening for colon cancer        Relevant Orders    Ambulatory Referral to Gastroenterology    Family history of colon cancer        Relevant Orders    Ambulatory Referral to Gastroenterology    BMI 37 0-37 9, adult              Immunizations and preventive care screenings were discussed with patient today  Appropriate education was printed on patient's after visit summary  Discussed risks and benefits of prostate cancer screening  We discussed the controversial history of PSA screening for prostate cancer in the [place] as well as the risk of over detection and over treatment of prostate cancer by way of PSA screening  The patient understands that PSA blood testing is an imperfect way to screen for prostate cancer and that elevated PSA levels in the blood may also be caused by infection, inflammation, prostatic trauma or manipulation, urological procedures, or by benign prostatic enlargement  The role of the digital rectal examination in prostate cancer screening was also discussed and I discussed with him that there is large interobserver variability in the findings of digital rectal examination  Counseling:  · Dental Health: discussed importance of regular tooth brushing, flossing, and dental visits  BMI Counseling: Body mass index is 37 46 kg/m²  The BMI is above normal  Nutrition recommendations include encouraging healthy choices of fruits and vegetables  Exercise recommendations include moderate physical activity 150 minutes/week  Rationale for BMI follow-up plan is due to patient being overweight or obese  Discussed daily allopurinol - given his resolve of symptoms with medrol pack - will hold off at this time  Low purine diet information provided to him  Referral to GI for colonoscopy - family hx of colon cancer, was to have one at 35years old but did not  He denies any rectal bleeding or history of bloody stools  Return in about 1 year (around 3/6/2024)  Chief Complaint:     Chief Complaint   Patient presents with   • Physical Exam   • Care Gap Request     BMI follow up         History of Present Illness:     Adult Annual Physical   Patient here for a comprehensive physical exam  The patient reports recent gout issue with left great toe - was seen by podiatry  has taken a medrol dose pack with good effect  Suspects he has had one other gout episode last year in the same location       Diet and Physical Activity  · Diet/Nutrition: well balanced diet  · Exercise: no formal exercise  Depression Screening  PHQ-2/9 Depression Screening         General Health  · Sleep: sleeps well  · Hearing: normal - bilateral   · Vision: no vision problems  · Dental: brushes teeth twice daily   Health  · Symptoms include: none     Review of Systems:     Review of Systems   Constitutional: Negative  Respiratory: Negative  Cardiovascular: Negative  Musculoskeletal:        See notes   Neurological: Negative  All other systems reviewed and are negative  Past Medical History:     History reviewed  No pertinent past medical history  Past Surgical History:     History reviewed  No pertinent surgical history  Family History:     History reviewed  No pertinent family history     Social History:     Social History     Socioeconomic History   • Marital status: /Civil Union     Spouse name: None   • Number of children: None   • Years of education: None   • Highest education level: None   Occupational History   • None   Tobacco Use   • Smoking status: Former   • Smokeless tobacco: Never   • Tobacco comments:     quit 9 yrs ago   Vaping Use   • Vaping Use: Never used   Substance and Sexual Activity   • Alcohol use: No   • Drug use: No   • Sexual activity: Not Currently   Other Topics Concern   • None   Social History Narrative   • None     Social Determinants of Health     Financial Resource Strain: Not on file   Food Insecurity: Not on file   Transportation Needs: Not on file   Physical Activity: Not on file   Stress: Not on file   Social Connections: Not on file   Intimate Partner Violence: Not on file   Housing Stability: Not on file      Current Medications:     Current Outpatient Medications   Medication Sig Dispense Refill   • methylPREDNISolone 4 MG tablet therapy pack Use as directed on package 1 each 0     No current facility-administered medications for this visit  Allergies:     No Known Allergies   Physical Exam:     /84 (BP Location: Right arm, Patient Position: Sitting, Cuff Size: Standard)   Pulse 74   Temp 98 2 °F (36 8 °C)   Ht 5' 8" (1 727 m)   Wt 112 kg (246 lb 6 4 oz)   SpO2 96%   BMI 37 46 kg/m²     Physical Exam  Constitutional:       Appearance: Normal appearance  HENT:      Head: Normocephalic and atraumatic  Cardiovascular:      Rate and Rhythm: Normal rate and regular rhythm  Pulses:           Dorsalis pedis pulses are 2+ on the right side and 2+ on the left side  Heart sounds: No murmur heard  No gallop  Pulmonary:      Effort: Pulmonary effort is normal  No respiratory distress  Breath sounds: Normal breath sounds  No wheezing or rales  Musculoskeletal:      Cervical back: Neck supple  Feet:      Right foot:      Skin integrity: No ulcer, skin breakdown, erythema, warmth, callus or dry skin  Left foot:      Skin integrity: No ulcer, skin breakdown, erythema, warmth, callus or dry skin  Neurological:      General: No focal deficit present  Mental Status: He is alert and oriented to person, place, and time  Mental status is at baseline  Psychiatric:         Mood and Affect: Mood normal          Behavior: Behavior normal          Thought Content:  Thought content normal          Judgment: Judgment normal           LULÚ Castillo  On license of [facility] PRIMARY [facility]

## 2023-03-08 ENCOUNTER — OFFICE VISIT (OUTPATIENT)
Dept: PODIATRY | Age: 48
End: 2023-03-08

## 2023-03-08 VITALS
HEIGHT: 68 IN | DIASTOLIC BLOOD PRESSURE: 92 MMHG | WEIGHT: 246 LBS | BODY MASS INDEX: 37.28 KG/M2 | SYSTOLIC BLOOD PRESSURE: 134 MMHG

## 2023-03-08 DIAGNOSIS — M79.675 GREAT TOE PAIN, LEFT: Primary | ICD-10-CM

## 2023-03-08 DIAGNOSIS — M10.9 ACUTE GOUT INVOLVING TOE OF LEFT FOOT, UNSPECIFIED CAUSE: ICD-10-CM

## 2023-03-08 RX ORDER — TRIAMCINOLONE ACETONIDE 40 MG/ML
20 INJECTION, SUSPENSION INTRA-ARTICULAR; INTRAMUSCULAR ONCE
Status: COMPLETED | OUTPATIENT
Start: 2023-03-08 | End: 2023-03-08

## 2023-03-08 RX ORDER — BUPIVACAINE HYDROCHLORIDE 2.5 MG/ML
0.5 INJECTION, SOLUTION INFILTRATION; PERINEURAL ONCE
Status: COMPLETED | OUTPATIENT
Start: 2023-03-08 | End: 2023-03-08

## 2023-03-08 RX ORDER — TESTOSTERONE CYPIONATE 200 MG/ML
2 INJECTION INTRAMUSCULAR ONCE
Status: COMPLETED | OUTPATIENT
Start: 2023-03-08 | End: 2023-03-08

## 2023-03-08 RX ADMIN — TRIAMCINOLONE ACETONIDE 20 MG: 40 INJECTION, SUSPENSION INTRA-ARTICULAR; INTRAMUSCULAR at 10:56

## 2023-03-08 RX ADMIN — BUPIVACAINE HYDROCHLORIDE 0.5 ML: 2.5 INJECTION, SOLUTION INFILTRATION; PERINEURAL at 10:51

## 2023-03-08 RX ADMIN — TESTOSTERONE CYPIONATE 2 MG: 200 INJECTION INTRAMUSCULAR at 10:53

## 2023-03-08 NOTE — PATIENT INSTRUCTIONS
Wear supportive shoes at all times  Avoid flip-flops, flat sandals, barefoot walking (never walk barefoot, even at home)  Generally avoid shoes that are too flexible and bend in the arch  Your shoes should only slightly bend in the toe area, not the middle  Running sneakers are often the best choice  Supportive sneaker brands: Natasha Washburn, New Balance, Asics, [employer]  Supportive daily shoe brands: Vionic, Orthofeet Nayeli, Dansko, Elizabeth branch, Jeramy Thompson, Qing  Supportive home shoes:  Igor Cullen (recovery slides)  Look in to over the counter shoe inserts/arch supports such as Dannanberg Jimy arch support

## 2023-03-08 NOTE — PROGRESS NOTES
Assessment/Plan:      Diagnoses and all orders for this visit:    Great toe pain, left  -     bupivacaine (MARCAINE) 0 25 % injection 0 5 mL  -     triamcinolone acetonide (KENALOG-40) 40 mg/mL injection 20 mg  -     dexamethasone (DECADRON) injection 2 mg    Acute gout involving toe of left foot, unspecified cause  -     bupivacaine (MARCAINE) 0 25 % injection 0 5 mL  -     triamcinolone acetonide (KENALOG-40) 40 mg/mL injection 20 mg  -     dexamethasone (DECADRON) injection 2 mg          IMPRESSION:  · Left 1st MTPJ pain, swelling and erythema - likely acute gout attack     PLAN:  · Pain improved s/p colchicine and medrol dose pack however not fully resolved  He opts for an injection today which was performed as below  · PCP appt 3/6/23 reviewed  Will hold off on allopurinol for now  · I recommended supportive and cushioned shoes at all times with ot [facility] arch supports  · C/w ice, rest  · F/u 2 weeks for recheck; full foot XR WB if no improvement  He will call sooner if issues arise            Foot injection     Date/Time 3/8/2023 11:16 AM     Performed by  Nick Mcgrath DPM     Authorized by Nick Mcgrath DPM      Universal Protocol   Consent: Verbal consent obtained    Risks and benefits: risks, benefits and alternatives were discussed  Consent given by: patient  Patient understanding: patient states understanding of the procedure being performed  Patient consent: the patient's understanding of the procedure matches consent given  Patient identity confirmed: verbally with patient        Local anesthesia used: yes      Anesthesia: local infiltration     Anesthesia   Local anesthesia used: yes  Local Anesthetic: bupivacaine 0 25% without epinephrine  Anesthetic total: 0 5 mL     Sedation   Patient sedated: no        Specimen: no    Culture: no   Procedure Details   Procedure Notes: Verbal consent obtained with risks (pain, infection, skin thinning, joint damage) and benefits reviewed for left 1st MTPJ steroid injection  Skin was prepped with alcohol and  5mL bupivicaine plain injected as local block to skin  Skin was prepped with betadine and 1:1:1 mixture of 0 3mL bupivicaine, kenalog 40, dexamethasone injected in to joint without issue  Bandaid applied  Pain was improved  Patient tolerated without issue  Subjective:      Patient ID: Queta Brower is a 49 y o  male  Lay Joao presents to clinic today for f/u pain, redness and swelling to left 1st toe joint  Notes the colchicine and then medrol dose pack did take away the pain but once he stopped the medication the swelling and tenderness came back slightly  He initially cancelled his appt today as he was feeling great  He has been trying to modify his diet  Went to PCP 3/6/23 and was doing well thus allopurinol was not started  The following portions of the patient's history were reviewed and updated as appropriate: allergies, current medications, past family history, past medical history, past social history, past surgical history and problem list     Review of Systems   Constitutional: Negative for activity change, chills and fever  HENT: Negative  Respiratory: Negative for cough, chest tightness and shortness of breath  Cardiovascular: Negative for chest pain and leg swelling  Endocrine: Negative  Genitourinary: Negative  Musculoskeletal: Positive for arthralgias (Left 1st MTPJ)  Skin: Positive for color change (L medial 1st MTPJ erythema)  Neurological: Negative  Negative for numbness  Psychiatric/Behavioral: Negative  Negative for agitation and behavioral problems  Objective:      /92 (BP Location: Left arm, Patient Position: Sitting, Cuff Size: Large)   Ht 5' 8" (1 727 m)   Wt 112 kg (246 lb)   BMI 37 40 kg/m²          Physical Exam  Constitutional:       General: He is not in acute distress  Appearance: Normal appearance  He is not ill-appearing  Cardiovascular:      Pulses: Normal pulses  Comments: Bilateral DP & PT pulses 2/4  CRT WNL  Pedal hair present  Feet warm and well perfused  Pulmonary:      Effort: No respiratory distress  Musculoskeletal:         General: Swelling (Left 1st MTPJ) and tenderness (Left medial 1st MTPJ) present  Comments: Bilateral ankle, STJ, TNJ, TMTJ ROM WNL and without pain  LE muscle strength WNL  Pain with ROM Left 1st MTPJ   Skin:     General: Skin is warm  Capillary Refill: Capillary refill takes less than 2 seconds  Findings: No erythema or lesion  Neurological:      General: No focal deficit present  Mental Status: He is alert and oriented to person, place, and time  Sensory: No sensory deficit  Comments: Gross sensation intact  Denies N/T/B to B/L feet      Psychiatric:         Mood and Affect: Mood normal          Behavior: Behavior normal

## 2023-03-22 ENCOUNTER — OFFICE VISIT (OUTPATIENT)
Dept: PODIATRY | Age: 48
End: 2023-03-22

## 2023-03-22 VITALS — HEIGHT: 68 IN | WEIGHT: 246 LBS | BODY MASS INDEX: 37.28 KG/M2

## 2023-03-22 DIAGNOSIS — M10.9 ACUTE GOUT INVOLVING TOE OF LEFT FOOT, UNSPECIFIED CAUSE: ICD-10-CM

## 2023-03-22 DIAGNOSIS — B35.1 ONYCHOMYCOSIS: ICD-10-CM

## 2023-03-22 DIAGNOSIS — M79.675 GREAT TOE PAIN, LEFT: Primary | ICD-10-CM

## 2023-03-22 NOTE — PROGRESS NOTES
Assessment/Plan:      Diagnoses and all orders for this visit:    Great toe pain, left    Acute gout involving toe of left foot, unspecified cause    Onychomycosis          IMPRESSION:  · Left 1st MTPJ pain, swelling and erythema - likely acute gout attack     PLAN:  · Pain resolved s/p steroid injection  · Again discussed low purine diet  · I recommended supportive and cushioned shoes at all times  · I discussed treatment for onychomycosis today including topical antifungal, oral antifungal, debridement  He opts for non-medication mnmgt  · F/u PRN      Subjective:      Patient ID: Sandy Ferreira is a 49 y o  male  Candido Richardson presents to clinic today for f/u pain, redness and swelling to left 1st toe joint  Notes pain has resolved s/p injection      The following portions of the patient's history were reviewed and updated as appropriate: allergies, current medications, past family history, past medical history, past social history, past surgical history and problem list     Review of Systems   Constitutional: Negative for activity change, chills and fever  HENT: Negative  Respiratory: Negative for cough, chest tightness and shortness of breath  Cardiovascular: Negative for chest pain and leg swelling  Endocrine: Negative  Genitourinary: Negative  Musculoskeletal: Negative for arthralgias (Left 1st MTPJ - resolved)  Skin: Negative for color change (L medial 1st MTPJ erythema - resolved)  Neurological: Negative  Negative for numbness  Psychiatric/Behavioral: Negative  Negative for agitation and behavioral problems  Objective:      Ht 5' 8" (1 727 m)   Wt 112 kg (246 lb)   BMI 37 40 kg/m²          Physical Exam  Constitutional:       General: He is not in acute distress  Appearance: Normal appearance  He is not ill-appearing  Cardiovascular:      Pulses: Normal pulses  Comments: Bilateral DP & PT pulses 2/4  CRT WNL  Pedal hair present  Feet warm and well perfused  Pulmonary:      Effort: No respiratory distress  Musculoskeletal:         General: No swelling (Left 1st MTPJ - resolved) or tenderness (Left medial 1st MTPJ - resolved)  Comments: Bilateral ankle, STJ, TNJ, TMTJ ROM WNL and without pain  LE muscle strength WNL  Pain with ROM Left 1st MTPJ   Skin:     General: Skin is warm  Capillary Refill: Capillary refill takes less than 2 seconds  Findings: No erythema or lesion  Neurological:      General: No focal deficit present  Mental Status: He is alert and oriented to person, place, and time  Sensory: No sensory deficit  Comments: Gross sensation intact  Denies N/T/B to B/L feet      Psychiatric:         Mood and Affect: Mood normal          Behavior: Behavior normal

## 2023-06-21 ENCOUNTER — TELEPHONE (OUTPATIENT)
Dept: FAMILY MEDICINE CLINIC | Facility: CLINIC | Age: 48
End: 2023-06-21

## 2023-06-21 DIAGNOSIS — M10.9 ACUTE GOUT INVOLVING TOE OF LEFT FOOT, UNSPECIFIED CAUSE: ICD-10-CM

## 2023-06-21 DIAGNOSIS — M79.675 GREAT TOE PAIN, LEFT: ICD-10-CM

## 2023-06-21 RX ORDER — METHYLPREDNISOLONE 4 MG/1
TABLET ORAL
Qty: 1 EACH | Refills: 0 | Status: SHIPPED | OUTPATIENT
Start: 2023-06-21

## 2023-06-21 NOTE — TELEPHONE ENCOUNTER
He doesn't need to be seen again - I ordered the same steroid pack the podiatrist did back in March - I would say that if the symptoms do not resolve with the pack, then he should reach back out to Dr Georgia Calzada, podiatry for another evaluation

## 2023-06-21 NOTE — TELEPHONE ENCOUNTER
Patient called stating he is having a gout flare up of his left big toe  He said he was to call you as soon as this happens  Pharmacy to use is [facility], [place]   He said he will come in for an appt if you need to see him first

## 2023-07-03 ENCOUNTER — TELEPHONE (OUTPATIENT)
Dept: OBGYN CLINIC | Facility: CLINIC | Age: 48
End: 2023-07-03

## 2023-07-17 ENCOUNTER — HOSPITAL ENCOUNTER (OUTPATIENT)
Dept: RADIOLOGY | Facility: CLINIC | Age: 48
Discharge: HOME | End: 2023-07-17
Payer: COMMERCIAL

## 2023-07-17 ENCOUNTER — OFFICE VISIT (OUTPATIENT)
Dept: PODIATRY | Age: 48
End: 2023-07-17
Payer: COMMERCIAL

## 2023-07-17 VITALS
HEART RATE: 93 BPM | DIASTOLIC BLOOD PRESSURE: 98 MMHG | SYSTOLIC BLOOD PRESSURE: 158 MMHG | WEIGHT: 243.8 LBS | BODY MASS INDEX: 36.95 KG/M2 | HEIGHT: 68 IN | OXYGEN SATURATION: 96 % | TEMPERATURE: 97.5 F

## 2023-07-17 DIAGNOSIS — M79.671 PAIN OF RIGHT HEEL: ICD-10-CM

## 2023-07-17 DIAGNOSIS — M76.61 ACHILLES TENDINITIS OF RIGHT LOWER EXTREMITY: ICD-10-CM

## 2023-07-17 DIAGNOSIS — M79.671 PAIN OF RIGHT HEEL: Primary | ICD-10-CM

## 2023-07-17 PROCEDURE — 73630 X-RAY EXAM OF FOOT: CPT

## 2023-07-17 PROCEDURE — 99214 OFFICE O/P EST MOD 30 MIN: CPT | Performed by: STUDENT IN AN ORGANIZED HEALTH CARE EDUCATION/TRAINING PROGRAM

## 2023-07-17 RX ORDER — MELOXICAM 7.5 MG/1
7.5 TABLET ORAL DAILY
Qty: 10 TABLET | Refills: 0 | Status: SHIPPED | OUTPATIENT
Start: 2023-07-17

## 2023-07-17 NOTE — PROGRESS NOTES
Assessment/Plan:      Diagnoses and all orders for this visit:    Pain of right heel  -     X-ray foot right 3+ views; Future  -     meloxicam (Mobic) 7.5 mg tablet; Take 1 tablet (7.5 mg total) by mouth daily    Achilles tendinitis of right lower extremity  -     meloxicam (Mobic) 7.5 mg tablet; Take 1 tablet (7.5 mg total) by mouth daily          IMAGING REVIEWED AT THIS VISIT (I reviewed and independently interpreted images via PACs:)  · Right foot WB 3v 7/17/23: slight elevation of 1st metatarsal. Posterior calcaneal enthesophyte. IMPRESSION:  · Left 1st MTPJ pain, swelling and erythema - likely acute gout attack. Left 1st MTPJ pain resolved s/p steroid injection, medrol dspk, c/w low purine diet  · New right heel pain. The differential diagnosis includes Achilles tendinitis or tendinosis, retrocalcaneal bursitis, Lucy's deformity, degenerative changes (calcaneal spurs, arthrosis of the joints of the foot), and inflammatory conditions of the ligaments and fascia of the foot and ankle. PLAN:  New issue, right heel pain. I discussed differentials as above. I explained at length the biomechanical abnormalities leading to the significant overload of the achilles tendon. I stressed the importance of proper shoe gear and OTC arch supports to reposition foot to reduce tension on soft tissue structures and give cushion. Instructions were given for conservative care which was also demonstrated during the clinical visit. I stressed the importance of achilles tendon stretching, icing and applying voltarin gel or biofreeze (OTC) at least 3x/day. I recommend stiff bottomed sneakers/shoes (ex Asics, Vionic, New balance, Marshall, etc) for daily use and Prince Alvarado (recovery slides) for in home use. I recommended shoes with soft or no back.    I advised pt to obtain OTC orthotics such as Dananberg arch supports  I rx'd short course of meloxicam today and discussed close monitoring for stomach bleed symptoms which he is to stop the medication immediately if she has such. Call for left 1st MTPJ joint injection PRN  · F/u 6 weeks for recheck; PT if no improvement      Subjective:      Patient ID: Joe Reid is a 49 y.o. male. Trisha Quintana presents to clinic today for new right heel pain. Notes pain started recently after bowling. Notes soreness and sharp pain at times while walking up or down stairs. Notes left 1st toe pain resolved (did have another gout attack after eating meat stick however pcp rx'd medrol dose pack and it resolved)      The following portions of the patient's history were reviewed and updated as appropriate: allergies, current medications, past family history, past medical history, past social history, past surgical history and problem list.    Review of Systems   Constitutional: Negative for activity change, chills and fever. HENT: Negative. Respiratory: Negative for cough, chest tightness and shortness of breath. Cardiovascular: Negative for chest pain and leg swelling. Endocrine: Negative. Genitourinary: Negative. Musculoskeletal: Positive for gait problem (Right heel pain). Negative for arthralgias (Left 1st MTPJ - resolved). Skin: Negative for color change (L medial 1st MTPJ erythema - resolved). Neurological: Negative for numbness. Psychiatric/Behavioral: Negative. Negative for agitation and behavioral problems. Objective:      /98 (BP Location: Right arm, Patient Position: Sitting, Cuff Size: Standard)   Pulse 93   Temp 97.5 °F (36.4 °C)   Ht 5' 8" (1.727 m)   Wt 111 kg (243 lb 12.8 oz)   SpO2 96%   BMI 37.07 kg/m²          Physical Exam  Constitutional:       General: He is not in acute distress. Appearance: Normal appearance. He is not ill-appearing. Cardiovascular:      Pulses: Normal pulses. Comments: Bilateral DP & PT pulses 2/4. CRT WNL. Pedal hair present. Feet warm and well perfused. Pulmonary:      Effort: No respiratory distress. Musculoskeletal:         General: Tenderness (Right posterior heel at insertion of achilles tendon with palpable spurring) present. No swelling (Left 1st MTPJ - resolved). Comments: Bilateral ankle, STJ, TNJ, TMTJ ROM WNL and without pain. LE muscle strength WNL. Resolved pain with ROM Left 1st MTPJ    Gastroc soleal equinus B/L LE. Skin:     General: Skin is warm. Capillary Refill: Capillary refill takes less than 2 seconds. Findings: No erythema or lesion. Neurological:      General: No focal deficit present. Mental Status: He is alert and oriented to person, place, and time. Sensory: No sensory deficit. Comments: Gross sensation intact. Denies N/T/B to B/L feet.     Psychiatric:         Mood and Affect: Mood normal.         Behavior: Behavior normal.

## 2023-07-17 NOTE — PATIENT INSTRUCTIONS
Foot Pain Home Therapy    Stretch. Place painful foot in back with heel on ground and lean against wall. Do not lift heel off of ground. You will feel the stretch in the calf muscles and possibly the heel. Hold the stretch for 20-30 seconds. Do this at least 5-6 times per day. It is best done after exercise when your muscles are warm. Local ice massage. Freeze a 20oz bottle of water. Roll your foot over the ice bottle along the arch. Try this for 20 minutes, 2-3 times per day. Wear supportive shoes at all times. Avoid flip-flops, flat sandals, barefoot walking (never walk barefoot, even at home). Generally avoid shoes that are too flexible and bend in the arch. Your shoes should only slightly bend in the toe area, not the middle. Running sneakers are often the best choice. Open back shoes. Supportive sneaker brands: Joshua Gibbons, [employer], [employer], [facility] (discount if mention Dr referred)  Supportive daily shoe brands: Vionic, Orthofeet, [place], Dansko, [place], [place], [place], Birkenstock  Supportive home shoes: Yvette Adhikari (recovery slides)  Purchase over the counter topical pain creams such as Voltarin gel, biofreeze, or CBD cream - will need to apply 2-3 times per day for benefit. + deep tissue massage. [employer]key: PediFix Visco-gel Achilles Protection Sleeve,White Large : Health & Household   Look in to over the counter shoe inserts/arch supports such as Dananberg arch supports. These are all available on [employer] as well as their individual website's. [employer]: Vasyli+Dananberg 1st Ray Orthotic, Medium, 1st Ray Function, Medium Density, Full-Length Insole, Heat Molding Optional, Best All Around Orthotic, Functional Biomechanical Control for Pain Relief, Black Yellow (12924) : Health & Household        Achilles Tendon Stretching Exercises    A) Standing Gastrocnemius stretch  Place hands on wall or chair. If using wall, put your hands at eye level.   Step the leg you want to stretch behind you. Keep your back heel on the floor and point your toes straight ahead or slightly inward towards the heel of the opposite foot. Bend your knee toward the wall while keeping your back leg straight. Lean toward the wall until you feel a gentle stretch in you calf of the straight leg. Don't lean so far that you feel pain. Hold for 15 seconds. Complete 3 reps. B) Standing soleus stretch  Place your hands on the wall or chair. If using wall, put your hands at eye level. Step the leg you want to stretch behind you (your back foot will need to be closer to the front foot than the above stretch). Keep your back heel on the floor and point your toes straight ahead or slightly inward towards the heel of the opposite foot. Bend both your front and back knee at the same time (may help to stick your butt out). You do not need to lean towards the wall, just bend the knees. Lean toward the wall until you feel a gentle stretch in you calf of the straight leg. Don't lean so far that you feel pain. Hold for 15 seconds. Complete 3 reps. Keep these tips and tricks in mind to get the most out of your stretching; Take your time - move slowly, whether you are deepening into a stretch or changing positions. This will limit the risk of injury & discomfort. Avoid bouncing - quick sudden movements will only worsen achilles tendon issues. Stay relaxed during stretch. Keep your heel down and toes straight ahead or slightly inward - this will allow the achilles tendon to stretch properly. Stop if you feel pain - Don't strain or force your muscle. If you feel sharp pain, stop immediately.

## 2023-09-15 ENCOUNTER — OFFICE VISIT (OUTPATIENT)
Dept: PODIATRY | Age: 48
End: 2023-09-15
Payer: COMMERCIAL

## 2023-09-15 VITALS
BODY MASS INDEX: 38.13 KG/M2 | DIASTOLIC BLOOD PRESSURE: 100 MMHG | TEMPERATURE: 98.3 F | SYSTOLIC BLOOD PRESSURE: 140 MMHG | HEART RATE: 86 BPM | OXYGEN SATURATION: 96 % | WEIGHT: 251.6 LBS | HEIGHT: 68 IN

## 2023-09-15 DIAGNOSIS — M79.671 PAIN OF RIGHT HEEL: ICD-10-CM

## 2023-09-15 DIAGNOSIS — M76.61 ACHILLES TENDINITIS OF RIGHT LOWER EXTREMITY: Primary | ICD-10-CM

## 2023-09-15 PROCEDURE — 99212 OFFICE O/P EST SF 10 MIN: CPT | Performed by: STUDENT IN AN ORGANIZED HEALTH CARE EDUCATION/TRAINING PROGRAM

## 2023-09-15 NOTE — PROGRESS NOTES
Assessment/Plan:      Diagnoses and all orders for this visit:    Achilles tendinitis of right lower extremity  -     Ambulatory referral to Physical Therapy; Future    Pain of right heel  -     Ambulatory referral to Physical Therapy; Future          PRIOR IMAGING   · Right foot WB 3v 7/17/23: slight elevation of 1st metatarsal. Posterior calcaneal enthesophyte. IMPRESSION:  · Left 1st MTPJ pain, swelling and erythema - likely acute gout attack. Left 1st MTPJ pain resolved s/p steroid injection, medrol dspk, c/w low purine diet  · New right heel pain. The differential diagnosis includes Achilles tendinitis or tendinosis, retrocalcaneal bursitis, Lucy's deformity, degenerative changes (calcaneal spurs, arthrosis of the joints of the foot), and inflammatory conditions of the ligaments and fascia of the foot and ankle. PLAN:  Right heel pain with some improvement. C/w AT stretching, proper shoe gear and OTC arch supports (CropUp Messenger), shoes with soft or no back. PT rx given   Call for left 1st MTPJ joint injection PRN  · F/u 8 weeks for recheck      Subjective:      Patient ID: Gaby Bosch is a 49 y.o. male. Royer Tompkins presents to clinic today ffor f/u right posterior heel pain. Pain has improved a bit. Has been stretching. Notes left 1st toe pain resolved (did have another gout attack after eating meat stick however pcp rx'd medrol dose pack and it resolved)      The following portions of the patient's history were reviewed and updated as appropriate: allergies, current medications, past family history, past medical history, past social history, past surgical history and problem list.    Review of Systems   Constitutional: Negative for activity change, chills and fever. HENT: Negative. Respiratory: Negative for cough, chest tightness and shortness of breath. Cardiovascular: Negative for chest pain and leg swelling. Endocrine: Negative. Genitourinary: Negative.     Musculoskeletal: Positive for gait problem (Right heel pain). Negative for arthralgias (Left 1st MTPJ - resolved). Skin: Negative for color change (L medial 1st MTPJ erythema - resolved). Neurological: Negative for numbness. Psychiatric/Behavioral: Negative. Negative for agitation and behavioral problems. Objective:      /100   Pulse 86   Temp 98.3 °F (36.8 °C) (Temporal)   Ht 5' 8" (1.727 m)   Wt 114 kg (251 lb 9.6 oz)   SpO2 96%   BMI 38.26 kg/m²          Physical Exam  Constitutional:       General: He is not in acute distress. Appearance: Normal appearance. He is not ill-appearing. Cardiovascular:      Pulses: Normal pulses. Comments: Bilateral DP & PT pulses 2/4. CRT WNL. Pedal hair present. Feet warm and well perfused. Pulmonary:      Effort: No respiratory distress. Musculoskeletal:         General: Tenderness (Right posterior heel at insertion of achilles tendon with palpable spurring) present. No swelling (Left 1st MTPJ - resolved). Comments: Bilateral ankle, STJ, TNJ, TMTJ ROM WNL and without pain. LE muscle strength WNL. Resolved pain with ROM Left 1st MTPJ    Gastroc soleal equinus B/L LE. Skin:     General: Skin is warm. Capillary Refill: Capillary refill takes less than 2 seconds. Findings: No erythema or lesion. Neurological:      General: No focal deficit present. Mental Status: He is alert and oriented to person, place, and time. Sensory: No sensory deficit. Comments: Gross sensation intact. Denies N/T/B to B/L feet.     Psychiatric:         Mood and Affect: Mood normal.         Behavior: Behavior normal.

## 2023-12-11 ENCOUNTER — OFFICE VISIT (OUTPATIENT)
Dept: FAMILY MEDICINE CLINIC | Facility: CLINIC | Age: 48
End: 2023-12-11
Payer: COMMERCIAL

## 2023-12-11 VITALS
BODY MASS INDEX: 37.44 KG/M2 | DIASTOLIC BLOOD PRESSURE: 94 MMHG | HEIGHT: 68 IN | SYSTOLIC BLOOD PRESSURE: 126 MMHG | WEIGHT: 247 LBS

## 2023-12-11 DIAGNOSIS — I10 PRIMARY HYPERTENSION: ICD-10-CM

## 2023-12-11 DIAGNOSIS — M10.072 ACUTE IDIOPATHIC GOUT OF LEFT FOOT: Primary | ICD-10-CM

## 2023-12-11 DIAGNOSIS — Z00.00 WELLNESS EXAMINATION: ICD-10-CM

## 2023-12-11 PROCEDURE — 99203 OFFICE O/P NEW LOW 30 MIN: CPT | Performed by: FAMILY MEDICINE

## 2023-12-11 NOTE — PROGRESS NOTES
Name: Nando Bhagat      : 1975      MRN: [id number]  Encounter Provider: Joy Calero MD  Encounter Date: 2023   Encounter department: [address] [place] [address] PRIMARY CARE    Assessment & Plan     1. Acute idiopathic gout of left foot  Assessment & Plan:  Discussed problem. Try to increase overall fluid intake which should lower the uric acid level      2. Wellness examination  -     Renal function panel; Future  -     Lipid panel; Future    3. Primary hypertension  Assessment & Plan:  Should watch the salt in the diet and push for weight loss. Repeat blood pressure borderline today          Depression Screening and Follow-up Plan: Patient was screened for depression during today's encounter. They screened negative with a PHQ-2 score of 0. Tobacco Cessation Counseling: Tobacco cessation counseling was not provided. The patient is sincerely urged to quit consumption of tobacco. He is not ready to quit tobacco. Medication options not discussed. Subjective      Patient seen for first office check. 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 Had not been watching salt in diet but is now doing this      Review of Systems   HENT:  Negative for congestion. Eyes:  Negative for visual disturbance. Respiratory:  Negative for cough, chest tightness and shortness of breath. Cardiovascular:  Negative for chest pain, palpitations and leg swelling. Gastrointestinal:  Negative for constipation and diarrhea. Endocrine: Negative for polyuria. Genitourinary:  Negative for difficulty urinating. Musculoskeletal:  Negative for arthralgias. Allergic/Immunologic: Negative for environmental allergies. Neurological:  Negative for dizziness and light-headedness. Psychiatric/Behavioral:  Negative for sleep disturbance. No current outpatient medications on file prior to visit. Objective     Blood Pressure 126/94 (BP Location: Left arm, Patient Position: Sitting, Cuff Size: Large)   Height 5' 8" (1.727 m)   Weight 112 kg (247 lb)   Body Mass Index 37.56 kg/m²     Physical Exam  Constitutional:       Appearance: Normal appearance. He is well-developed. HENT:      Head: Normocephalic. Nose: Nose normal.      Mouth/Throat:      Mouth: Mucous membranes are moist.   Eyes:      Extraocular Movements: Extraocular movements intact. Conjunctiva/sclera: Conjunctivae normal.      Pupils: Pupils are equal, round, and reactive to light. Neck:      Thyroid: No thyromegaly. Vascular: No carotid bruit. Cardiovascular:      Rate and Rhythm: Normal rate and regular rhythm. Heart sounds: Normal heart sounds. No murmur (Rate is 66) heard. Pulmonary:      Effort: Pulmonary effort is normal.      Breath sounds: Normal breath sounds. Abdominal:      General: Abdomen is flat. There is no distension. Palpations: Abdomen is soft. There is no mass. Tenderness: There is no abdominal tenderness. Musculoskeletal:         General: Normal range of motion. Cervical back: Normal range of motion and neck supple. No tenderness. Right lower leg: No edema. Left lower leg: No edema. Lymphadenopathy:      Cervical: No cervical adenopathy. Skin:     General: Skin is warm and dry. Findings: No rash. Neurological:      Mental Status: He is alert and oriented to person, place, and time. Cranial Nerves: No cranial nerve deficit. Motor: No weakness. Coordination: Coordination normal.      Gait: Gait normal.      Deep Tendon Reflexes: Reflexes normal.   Psychiatric:         Mood and Affect: Mood normal.         Behavior: Behavior normal.         Thought Content:  Thought content normal.         Judgment: Judgment normal.       Maddie Harding MD

## 2023-12-11 NOTE — PATIENT INSTRUCTIONS
Discussed the problem with the blood pressure. Continue to watch the salt and try to push for weight loss. Should just push fluids to help with the gout.   Before next visit get the renal and lipid panel done as part of wellness check but do this after 2/22

## 2023-12-12 PROBLEM — I10 PRIMARY HYPERTENSION: Status: ACTIVE | Noted: 2023-12-12

## 2023-12-12 PROBLEM — M10.9 GOUT: Status: ACTIVE | Noted: 2023-12-12

## 2023-12-12 NOTE — ASSESSMENT & PLAN NOTE
Should watch the salt in the diet and push for weight loss.   Repeat blood pressure borderline today

## 2024-02-26 ENCOUNTER — RA CDI HCC (OUTPATIENT)
Dept: OTHER | Facility: HOSPITAL | Age: 49
End: 2024-02-26

## 2024-02-27 ENCOUNTER — LAB (OUTPATIENT)
Dept: LAB | Facility: HOSPITAL | Age: 49
End: 2024-02-27
Payer: COMMERCIAL

## 2024-02-27 ENCOUNTER — TELEPHONE (OUTPATIENT)
Dept: FAMILY MEDICINE CLINIC | Facility: CLINIC | Age: 49
End: 2024-02-27

## 2024-02-27 DIAGNOSIS — Z00.00 WELLNESS EXAMINATION: ICD-10-CM

## 2024-02-27 LAB
ALBUMIN SERPL BCP-MCNC: 4.2 G/DL (ref 3.5–5)
ANION GAP SERPL CALCULATED.3IONS-SCNC: 5 MMOL/L
BUN SERPL-MCNC: 10 MG/DL (ref 5–25)
CALCIUM SERPL-MCNC: 8.9 MG/DL (ref 8.4–10.2)
CHLORIDE SERPL-SCNC: 106 MMOL/L (ref 96–108)
CHOLEST SERPL-MCNC: 227 MG/DL
CO2 SERPL-SCNC: 27 MMOL/L (ref 21–32)
CREAT SERPL-MCNC: 0.86 MG/DL (ref 0.6–1.3)
GFR SERPL CREATININE-BSD FRML MDRD: 101 ML/MIN/1.73SQ M
GLUCOSE P FAST SERPL-MCNC: 142 MG/DL (ref 65–99)
HDLC SERPL-MCNC: 30 MG/DL
LDLC SERPL CALC-MCNC: 155 MG/DL (ref 0–100)
NONHDLC SERPL-MCNC: 197 MG/DL
PHOSPHATE SERPL-MCNC: 2.9 MG/DL (ref 2.7–4.5)
POTASSIUM SERPL-SCNC: 4.2 MMOL/L (ref 3.5–5.3)
SODIUM SERPL-SCNC: 138 MMOL/L (ref 135–147)
TRIGL SERPL-MCNC: 211 MG/DL

## 2024-02-27 PROCEDURE — 36415 COLL VENOUS BLD VENIPUNCTURE: CPT

## 2024-02-27 PROCEDURE — 80061 LIPID PANEL: CPT

## 2024-02-27 PROCEDURE — 80069 RENAL FUNCTION PANEL: CPT

## 2024-02-27 NOTE — LETTER
Date: 2/27/2024    Zoltan Damon  [address] [facility] [address]    Dear Zoltan Damon,      We have attempted to reach you regarding your upcoming appointment on 3/13 with Dr. Marsh.     Unfortunately, due to a change in the provider's schedule we need to change your appointment. Please call our office as soon as possible so we can reschedule your appointment. We apologize for any inconvenience this may cause.     Thank you in advance for your cooperation and assistance.          Sincerely,  Dejah Good

## 2024-02-27 NOTE — RESULT ENCOUNTER NOTE
Please call the patient regarding his abnormal result.  The fasting blood sugar is elevated and at next visit we will do A1c.  Also the cholesterol is mildly elevated

## 2024-03-21 ENCOUNTER — OFFICE VISIT (OUTPATIENT)
Dept: FAMILY MEDICINE CLINIC | Facility: CLINIC | Age: 49
End: 2024-03-21
Payer: COMMERCIAL

## 2024-03-21 VITALS
SYSTOLIC BLOOD PRESSURE: 128 MMHG | BODY MASS INDEX: 37.28 KG/M2 | DIASTOLIC BLOOD PRESSURE: 80 MMHG | HEART RATE: 72 BPM | HEIGHT: 68 IN | WEIGHT: 246 LBS | OXYGEN SATURATION: 97 %

## 2024-03-21 DIAGNOSIS — E11.9 TYPE 2 DIABETES MELLITUS WITHOUT COMPLICATION, WITHOUT LONG-TERM CURRENT USE OF INSULIN (HCC): ICD-10-CM

## 2024-03-21 DIAGNOSIS — M10.072 ACUTE IDIOPATHIC GOUT OF LEFT FOOT: ICD-10-CM

## 2024-03-21 DIAGNOSIS — I10 PRIMARY HYPERTENSION: ICD-10-CM

## 2024-03-21 DIAGNOSIS — Z00.00 WELLNESS EXAMINATION: Primary | ICD-10-CM

## 2024-03-21 DIAGNOSIS — R73.09 ABNORMAL GLUCOSE: ICD-10-CM

## 2024-03-21 LAB — SL AMB POCT HEMOGLOBIN AIC: 7.1 (ref ?–6.5)

## 2024-03-21 PROCEDURE — 83036 HEMOGLOBIN GLYCOSYLATED A1C: CPT | Performed by: FAMILY MEDICINE

## 2024-03-21 PROCEDURE — 99396 PREV VISIT EST AGE 40-64: CPT | Performed by: FAMILY MEDICINE

## 2024-03-21 NOTE — PATIENT INSTRUCTIONS
Overall exam today looks good and blood pressure is okay.  The fasting blood sugar is 145 and the A1c is 7.1.  Discussed dietary changes as does have mild diabetes.  Push the daily physical activity.  The cholesterol is slightly elevated and should push fiber in diet.  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

## 2024-03-21 NOTE — ASSESSMENT & PLAN NOTE
Discussed problem and dietary changes.  Try to push daily physical activity.  Will reassess here in 3 months and consider use of medication at that time according to A1c  Lab Results   Component Value Date    HGBA1C 7.1 (A) 03/21/2024

## 2024-03-21 NOTE — PROGRESS NOTES
ADULT ANNUAL PHYSICAL  [facility] PRIMARY CARE    NAME: Zoltan Damon  AGE: 48 y.o. SEX: male  : 1975     DATE: 3/21/2024     Assessment and Plan:     Problem List Items Addressed This Visit          Cardiovascular and Mediastinum    Primary hypertension     Blood pressure okay today.  Watch salt in diet and push for weight loss.  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  Try to push daily physical activity.  Will reassess here in 3 months and consider use of medication at that time according to A1c  Lab Results   Component Value Date    HGBA1C 7.1 (A) 2024               Orthopedic/Musculoskeletal    Gout     Problem has not returned.  Continue to push fluids          Other Visit Diagnoses       Wellness examination    -  Primary    Abnormal glucose        Relevant Orders    POCT hemoglobin A1c (Completed)            Immunizations and preventive care screenings were discussed with patient today. Appropriate education was printed on patient's after visit summary.        Counseling:  Dental Health: discussed importance of regular tooth brushing, flossing, and dental visits.  Exercise: the importance of regular exercise/physical activity was discussed. Recommend exercise 3-5 times per week for at least 30 minutes.   Watch starch in diet      Tobacco Cessation Counseling: Tobacco cessation counseling was not provided. The patient is sincerely urged to quit consumption of tobacco. He is not ready to quit tobacco. Medication options not discussed.         Return in about 3 months (around 2024) for Recheck.     Chief Complaint:     Chief Complaint   Patient presents with     Physical Exam      History of Present Illness:     Adult Annual Physical   Patient here for a comprehensive physical exam. The patient reports no problems.    Diet and Physical Activity  Diet/Nutrition: poor diet.   Exercise: walking.      Depression Screening  PHQ-2/9 Depression Screening           General Health  Sleep: sleeps well.   Hearing: decreased - bilateral.  Vision: no vision problems.   Dental: no dental visits for >1 year.        Health  Symptoms include: none    Advanced Care Planning  Do you have an advanced directive? no  Do you have a durable medical power of ? no  ACP document given to patient? no     Review of Systems:     Review of Systems   Constitutional:  Negative for activity change, appetite change, chills, fatigue, fever and unexpected weight change.   HENT:  Negative for congestion, dental problem, hearing loss, rhinorrhea and trouble swallowing.    Eyes:  Negative for visual disturbance.   Respiratory:  Negative for apnea, cough, chest tightness and shortness of breath.    Cardiovascular:  Negative for chest pain, palpitations and leg swelling.   Gastrointestinal:  Negative for abdominal distention, abdominal pain, constipation and diarrhea.   Endocrine: Negative for polyuria (Nocturia x 0-1).   Genitourinary:  Negative for difficulty urinating.   Musculoskeletal:  Negative for arthralgias and myalgias.   Skin:  Negative for rash.   Allergic/Immunologic: Negative for environmental allergies.   Neurological:  Negative for dizziness, weakness, light-headedness, numbness and headaches.   Hematological:  Negative for adenopathy.   Psychiatric/Behavioral:  Negative for agitation and sleep disturbance.       Past Medical History:     History reviewed. No pertinent past medical history.   Past Surgical History:     History reviewed. No pertinent surgical history.   Family History:     History reviewed. No pertinent family history.   Social History:     Social History     Socioeconomic  "History    Marital status: /Civil Union     Spouse name: None    Number of children: None    Years of education: None    Highest education level: None   Occupational History    None   Tobacco Use    Smoking status: Former     Types: Cigarettes    Smokeless tobacco: Current     Types: Chew    Tobacco comments:     quit 9 yrs ago   Vaping Use    Vaping status: Never Used   Substance and Sexual Activity    Alcohol use: No    Drug use: No    Sexual activity: Not Currently   Other Topics Concern    None   Social History Narrative    None     Social Determinants of Health     Financial Resource Strain: Not on file   Food Insecurity: Not on file   Transportation Needs: Not on file   Physical Activity: Not on file   Stress: Not on file   Social Connections: Not on file   Intimate Partner Violence: Not on file   Housing Stability: Not on file      Current Medications:     No current outpatient medications on file.     No current facility-administered medications for this visit.      Allergies:     No Known Allergies   Physical Exam:     Blood Pressure 128/80 (BP Location: Left arm, Patient Position: Sitting, Cuff Size: Large)   Pulse 72   Height 5' 8\" (1.727 m)   Weight 112 kg (246 lb)   Oxygen Saturation 97%   Body Mass Index 37.40 kg/m²     Physical Exam  Vitals and nursing note reviewed.   Constitutional:       Appearance: Normal appearance.   HENT:      Head: Normocephalic.      Right Ear: Tympanic membrane, ear canal and external ear normal. Decreased hearing (25 dB hearing screen off at 4000 Hz.  No difficulty with conversation) noted.      Left Ear: Tympanic membrane, ear canal and external ear normal. Decreased hearing (25 dB hearing screen off at 4000 Hz) noted.      Nose: Nose normal.      Mouth/Throat:      Mouth: Mucous membranes are moist.      Dentition: Normal dentition.   Eyes:      Extraocular Movements: Extraocular movements intact.      Conjunctiva/sclera: Conjunctivae normal.      Pupils: " Pupils are equal, round, and reactive to light.   Neck:      Vascular: No carotid bruit.   Cardiovascular:      Rate and Rhythm: Normal rate and regular rhythm.      Pulses:           Dorsalis pedis pulses are 2+ on the right side and 2+ on the left side.        Posterior tibial pulses are 1+ on the right side and 1+ on the left side.      Heart sounds: Normal heart sounds. No murmur (Rate is 72) heard.  Pulmonary:      Effort: Pulmonary effort is normal.      Breath sounds: Normal breath sounds.   Abdominal:      General: Abdomen is flat. There is no distension.      Palpations: Abdomen is soft. There is no mass.      Tenderness: There is no abdominal tenderness.      Hernia: There is no hernia in the left inguinal area or right inguinal area.   Genitourinary:     Penis: Normal and circumcised.       Testes: Normal.   Musculoskeletal:         General: No swelling.      Cervical back: Normal range of motion and neck supple. No tenderness. No muscular tenderness.      Right lower leg: No edema.      Left lower leg: No edema.      Right foot: No deformity.      Left foot: No deformity.   Feet:      Right foot:      Protective Sensation: 8 sites tested.  8 sites sensed.      Skin integrity: Skin integrity normal.      Left foot:      Protective Sensation: 8 sites tested.  8 sites sensed.   Lymphadenopathy:      Cervical: No cervical adenopathy.   Skin:     General: Skin is warm and dry.      Capillary Refill: Capillary refill takes 2 to 3 seconds.      Findings: No rash.   Neurological:      General: No focal deficit present.      Mental Status: He is alert and oriented to person, place, and time.      Cranial Nerves: No cranial nerve deficit.      Sensory: No sensory deficit.      Motor: No weakness.      Coordination: Coordination normal.      Gait: Gait normal.      Deep Tendon Reflexes: Reflexes normal.   Psychiatric:         Mood and Affect: Mood normal.         Behavior: Behavior normal.         Thought Content:  Thought content normal.         Judgment: Judgment normal.          Tulio Marsh MD  [facility]

## 2024-03-21 NOTE — ASSESSMENT & PLAN NOTE
Blood pressure okay today.  Watch salt in diet and push for weight loss.  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

## 2024-04-04 ENCOUNTER — OFFICE VISIT (OUTPATIENT)
Dept: FAMILY MEDICINE CLINIC | Facility: CLINIC | Age: 49
End: 2024-04-04
Payer: COMMERCIAL

## 2024-04-04 VITALS
BODY MASS INDEX: 37.74 KG/M2 | WEIGHT: 249 LBS | HEIGHT: 68 IN | SYSTOLIC BLOOD PRESSURE: 130 MMHG | DIASTOLIC BLOOD PRESSURE: 74 MMHG

## 2024-04-04 DIAGNOSIS — E11.9 TYPE 2 DIABETES MELLITUS WITHOUT COMPLICATION, WITHOUT LONG-TERM CURRENT USE OF INSULIN (HCC): ICD-10-CM

## 2024-04-04 DIAGNOSIS — M10.071 ACUTE IDIOPATHIC GOUT OF RIGHT FOOT: Primary | ICD-10-CM

## 2024-04-04 PROCEDURE — 99213 OFFICE O/P EST LOW 20 MIN: CPT | Performed by: FAMILY MEDICINE

## 2024-04-04 RX ORDER — METHYLPREDNISOLONE 4 MG/1
TABLET ORAL
Qty: 21 EACH | Refills: 0 | Status: SHIPPED | OUTPATIENT
Start: 2024-04-04

## 2024-04-04 NOTE — PROGRESS NOTES
"Name: Zoltan Damon      : 1975      MRN: [id number]  Encounter Provider: Tulio Marsh MD  Encounter Date: 2024   Encounter department: [facility] PRIMARY CARE    Assessment & Plan     1. Acute idiopathic gout of right foot  Assessment & Plan:  Discussed problem.  Try to push fluids.  Will place on Medrol Dosepak but realized this will elevate the blood sugar and needs to be careful with the sweets and starch in the diet    Orders:  -     methylPREDNISolone 4 MG tablet therapy pack; Use as directed on package    2. Type 2 diabetes mellitus without complication, without long-term current use of insulin (HCC)           Subjective      Patient has history of diabetes mellitus adult, hypertension and gout.  Had developed pain in the right great toe over the last few days      Review of Systems   Constitutional:  Negative for fever.   Gastrointestinal:  Negative for abdominal pain and nausea.   Musculoskeletal:  Positive for arthralgias.   Skin:  Positive for color change (Redness at the base of the great toe on the right).   Psychiatric/Behavioral:  Negative for sleep disturbance.        No current outpatient medications on file prior to visit.       Objective     Blood Pressure 130/74 (BP Location: Left arm, Patient Position: Sitting, Cuff Size: Large)   Height 5' 8\" (1.727 m)   Weight 113 kg (249 lb)   Body Mass Index 37.86 kg/m²     Physical Exam  Vitals and nursing note reviewed.   Constitutional:       Appearance: He is well-developed.   HENT:      Head: Normocephalic.   Eyes:      Conjunctiva/sclera: Conjunctivae normal.   Neck:      Thyroid: No thyromegaly.   Cardiovascular:      Rate and Rhythm: Normal rate and regular rhythm.      Heart sounds: Normal heart sounds. No murmur (Rate is 72) heard.  Pulmonary:      Effort: Pulmonary effort is normal.      Breath sounds: Normal breath sounds.   Abdominal:      General: Abdomen is flat. There is no distension.      " Palpations: Abdomen is soft. There is no mass.      Tenderness: There is no abdominal tenderness.   Musculoskeletal:         General: Normal range of motion.      Cervical back: Normal range of motion and neck supple. No tenderness.      Right lower leg: No edema.      Left lower leg: No edema.        Feet:    Lymphadenopathy:      Cervical: No cervical adenopathy.   Skin:     General: Skin is warm and dry.      Findings: No rash.   Neurological:      Mental Status: He is alert and oriented to person, place, and time.      Motor: No weakness.      Coordination: Coordination abnormal.      Gait: Gait normal.   Psychiatric:         Mood and Affect: Mood normal.       Tulio Marsh MD

## 2024-04-04 NOTE — ASSESSMENT & PLAN NOTE
Discussed problem.  Try to push fluids.  Will place on Medrol Dosepak but realized this will elevate the blood sugar and needs to be careful with the sweets and starch in the diet

## 2024-04-04 NOTE — PATIENT INSTRUCTIONS
Discussed problem.  I do feel has gout in the right great toe base.  Will place on Medrol Dosepak but be very careful with diet avoiding sweets and trying to limit starches because the Medrol Dosepak will push the blood sugar up

## 2024-04-23 ENCOUNTER — APPOINTMENT (OUTPATIENT)
Dept: RADIOLOGY | Facility: CLINIC | Age: 49
End: 2024-04-23
Payer: COMMERCIAL

## 2024-04-23 ENCOUNTER — OFFICE VISIT (OUTPATIENT)
Dept: PODIATRY | Facility: CLINIC | Age: 49
End: 2024-04-23
Payer: COMMERCIAL

## 2024-04-23 VITALS
BODY MASS INDEX: 36.95 KG/M2 | SYSTOLIC BLOOD PRESSURE: 173 MMHG | WEIGHT: 243 LBS | DIASTOLIC BLOOD PRESSURE: 113 MMHG | HEART RATE: 71 BPM

## 2024-04-23 DIAGNOSIS — M79.671 RIGHT FOOT PAIN: Primary | ICD-10-CM

## 2024-04-23 DIAGNOSIS — M79.671 PAIN OF RIGHT HEEL: ICD-10-CM

## 2024-04-23 DIAGNOSIS — M76.61 ACHILLES TENDINITIS OF RIGHT LOWER EXTREMITY: ICD-10-CM

## 2024-04-23 DIAGNOSIS — M10.9 ACUTE GOUT OF RIGHT FOOT, UNSPECIFIED CAUSE: ICD-10-CM

## 2024-04-23 PROCEDURE — 99213 OFFICE O/P EST LOW 20 MIN: CPT | Performed by: STUDENT IN AN ORGANIZED HEALTH CARE EDUCATION/TRAINING PROGRAM

## 2024-04-23 PROCEDURE — 20600 DRAIN/INJ JOINT/BURSA W/O US: CPT | Performed by: STUDENT IN AN ORGANIZED HEALTH CARE EDUCATION/TRAINING PROGRAM

## 2024-04-23 PROCEDURE — 73630 X-RAY EXAM OF FOOT: CPT

## 2024-04-23 RX ORDER — TRIAMCINOLONE ACETONIDE 40 MG/ML
40 INJECTION, SUSPENSION INTRA-ARTICULAR; INTRAMUSCULAR ONCE
Status: COMPLETED | OUTPATIENT
Start: 2024-04-23 | End: 2024-04-23

## 2024-04-23 RX ORDER — DEXAMETHASONE SODIUM PHOSPHATE 4 MG/ML
4 INJECTION, SOLUTION INTRA-ARTICULAR; INTRALESIONAL; INTRAMUSCULAR; INTRAVENOUS; SOFT TISSUE ONCE
Status: COMPLETED | OUTPATIENT
Start: 2024-04-23 | End: 2024-04-23

## 2024-04-23 RX ADMIN — DEXAMETHASONE SODIUM PHOSPHATE 4 MG: 4 INJECTION, SOLUTION INTRA-ARTICULAR; INTRALESIONAL; INTRAMUSCULAR; INTRAVENOUS; SOFT TISSUE at 09:54

## 2024-04-23 RX ADMIN — TRIAMCINOLONE ACETONIDE 40 MG: 40 INJECTION, SUSPENSION INTRA-ARTICULAR; INTRAMUSCULAR at 09:54

## 2024-04-23 NOTE — PROGRESS NOTES
Assessment/Plan:    No problem-specific Assessment & Plan notes found for this encounter.       Diagnoses and all orders for this visit:    Right foot pain  -     X-ray foot right 3+ views; Future  -     triamcinolone acetonide (KENALOG-40) 40 mg/mL injection 40 mg  -     dexamethasone (DECADRON) injection 4 mg    Acute gout of right foot, unspecified cause  -     X-ray foot right 3+ views; Future  -     triamcinolone acetonide (KENALOG-40) 40 mg/mL injection 40 mg  -     dexamethasone (DECADRON) injection 4 mg     Plan:     -  Patient was counseled and educated on the condition and the diagnosis.  The diagnosis, treatment options and prognosis were discussed in detail.   - Right foot xrays obtained, I personally interpreted the findings with patient, no acute osseous abnormalities noted.   - Patient presents with right 1st MTPJ pain, edema and resolving erythema I suspect acute gout arthropathy.  Recommended intra-articular cortisone injection.  Injection as detailed below.  - The nature of gout is fully explained, including dietary relationship, acute and interval phase and treatment of both. Long term complications such as kidney stones, tophi and arthritis are discussed. Avoidance of alcohol recommended, and written literature is given along with a low purine diet. Indications for the use of colchicine/prednisone to prevent or treat flare-ups is also discussed. Call if further attacks occur, or this one does not resolve promptly.  - Return in 2-3 weeks for follow up       Subjective:      Patient ID: Zoltan Damon is a 48 y.o. male.    48-year-old male with past medical history as detailed below presents for evaluation of right big toe joint pain secondary to possible gout arthropathy.  Patient reports he was evaluated by PCP who put him on Medrol Dosepak that helped take away most of the pain however he continues to have lingering discomfort with pressure wearing shoes and socks.  Erythema is slowly regressing.  " Patient had history of MPJ gout arthropathy for which he had intra-articular injection in past and that helped him.  He has no other complaints.        The following portions of the patient's history were reviewed and updated as appropriate: He  has no past medical history on file.  He   Patient Active Problem List    Diagnosis Date Noted    Type 2 diabetes mellitus without complication, without long-term current use of insulin ([facility]) 03/21/2024    Gout 12/12/2023    Primary hypertension 12/12/2023     He  has no past surgical history on file.  Current Outpatient Medications   Medication Sig Dispense Refill    methylPREDNISolone 4 MG tablet therapy pack Use as directed on package (Patient not taking: Reported on 4/23/2024) 21 each 0     No current facility-administered medications for this visit.   .    Review of Systems   All other systems reviewed and are negative.        Objective:      BP (!) 173/113 (BP Location: Left arm, Patient Position: Sitting, Cuff Size: Large) Comment: Patient states it is high because he is in pain  Pulse 71   Wt 110 kg (243 lb)   BMI 36.95 kg/m²          Physical Exam  Vitals reviewed.   Cardiovascular:      Pulses:           Dorsalis pedis pulses are 2+ on the right side.        Posterior tibial pulses are 2+ on the right side.   Musculoskeletal:      Right foot: Decreased range of motion. Tenderness and bony tenderness present.      Comments: Tenderness to touch noted throughout the right first metatarsophalangeal joint with edema.  Resolving erythema noted.  Pain with range of motion of first MTPJ.         Small joint arthrocentesis: R great MTP  Universal Protocol:  Consent: Verbal consent obtained.  Risks and benefits: risks, benefits and alternatives were discussed  Consent given by: patient  Time out: Immediately prior to procedure a \"time out\" was called to verify the correct patient, procedure, equipment, support staff and site/side marked as required.  Patient " understanding: patient states understanding of the procedure being performed  Patient identity confirmed: verbally with patient  Supporting Documentation  Indications: joint swelling and pain   Procedure Details  Location: great toe - R great MTP  Preparation: Patient was prepped and draped in the usual sterile fashion  Needle size: 25 G  Ultrasound guidance: no  Approach: dorsal    Patient tolerance: patient tolerated the procedure well with no immediate complications

## 2024-05-07 ENCOUNTER — OFFICE VISIT (OUTPATIENT)
Dept: PODIATRY | Facility: CLINIC | Age: 49
End: 2024-05-07
Payer: COMMERCIAL

## 2024-05-07 VITALS
BODY MASS INDEX: 36.83 KG/M2 | HEIGHT: 68 IN | SYSTOLIC BLOOD PRESSURE: 145 MMHG | WEIGHT: 243 LBS | HEART RATE: 74 BPM | DIASTOLIC BLOOD PRESSURE: 94 MMHG

## 2024-05-07 DIAGNOSIS — Z87.39 HISTORY OF GOUT: Primary | ICD-10-CM

## 2024-05-07 PROCEDURE — 99212 OFFICE O/P EST SF 10 MIN: CPT | Performed by: STUDENT IN AN ORGANIZED HEALTH CARE EDUCATION/TRAINING PROGRAM

## 2024-05-07 NOTE — PROGRESS NOTES
"Assessment/Plan:    No problem-specific Assessment & Plan notes found for this encounter.       Diagnoses and all orders for this visit:    History of gout      Plan:     -  Patient was counseled and educated on the condition and the diagnosis.  The diagnosis, treatment options and prognosis were discussed in detail.   -Right foot pain has resolved, injection helped calm down acute gout flare.  I did inform patient to discuss with his PCP regarding frequent gout attacks and long-term treatment care options.  I informed patient if he experiences another gout flare he is to return to office or call. patient expressed understanding.  -Addressed all questions and concerns    Subjective:      Patient ID: Zoltan Damon is a 48 y.o. male.    48-year-old male with past medical history as detailed below presents for follow-up of right first metatarsophalangeal joint pain secondary to acute gout flare.  Patient reports he is doing well the injection helped him tremendously.  Currently he intermittently has discomfort with weightbearing.  No other complaints.        The following portions of the patient's history were reviewed and updated as appropriate: He  has no past medical history on file.  He   Patient Active Problem List    Diagnosis Date Noted    Type 2 diabetes mellitus without complication, without long-term current use of insulin ([facility]) 03/21/2024    Gout 12/12/2023    Primary hypertension 12/12/2023     He  has no past surgical history on file.  Current Outpatient Medications   Medication Sig Dispense Refill    methylPREDNISolone 4 MG tablet therapy pack Use as directed on package (Patient not taking: Reported on 4/23/2024) 21 each 0     No current facility-administered medications for this visit.   .    Review of Systems   All other systems reviewed and are negative.        Objective:      /94 (BP Location: Right arm, Patient Position: Sitting, Cuff Size: Large)   Pulse 74   Ht 5' 8\" (1.727 m)   Wt 110 kg " (243 lb)   BMI 36.95 kg/m²          Physical Exam  Vitals reviewed.   Musculoskeletal:      Right foot: Decreased range of motion. No tenderness.      Comments: No tenderness to touch noted to the right first metatarsophalangeal joint.  Decrease in first MTPJ range of motion noted.  No erythema edema present.

## 2024-06-28 ENCOUNTER — OFFICE VISIT (OUTPATIENT)
Dept: FAMILY MEDICINE CLINIC | Facility: CLINIC | Age: 49
End: 2024-06-28
Payer: COMMERCIAL

## 2024-06-28 VITALS
WEIGHT: 236 LBS | OXYGEN SATURATION: 93 % | BODY MASS INDEX: 35.77 KG/M2 | SYSTOLIC BLOOD PRESSURE: 122 MMHG | DIASTOLIC BLOOD PRESSURE: 78 MMHG | HEIGHT: 68 IN | HEART RATE: 82 BPM

## 2024-06-28 DIAGNOSIS — E11.9 TYPE 2 DIABETES MELLITUS WITHOUT COMPLICATION, WITHOUT LONG-TERM CURRENT USE OF INSULIN (HCC): Primary | ICD-10-CM

## 2024-06-28 DIAGNOSIS — I10 PRIMARY HYPERTENSION: ICD-10-CM

## 2024-06-28 PROCEDURE — 99213 OFFICE O/P EST LOW 20 MIN: CPT | Performed by: FAMILY MEDICINE

## 2024-06-28 NOTE — PROGRESS NOTES
Ambulatory Visit  Name: Zoltan Damon      : 1975      MRN: [id number]  Encounter Provider: Tulio Marsh MD  Encounter Date: 2024   Encounter department: [facility] PRIMARY [facility]ARE    Assessment & Plan   1. Type 2 diabetes mellitus without complication, without long-term current use of insulin ([facility])  Assessment & Plan:  A1c is now 6.4 and it is showing good weight loss of 13 pounds.  Would continue to push diet and activity at this time  Lab Results   Component Value Date    HGBA1C 7.1 (A) 2024     2. Primary hypertension  Assessment & Plan:  Pressure good today.  Continue to watch salt in diet         History of Present Illness     Patient has history of diabetes mellitus adult recently diagnosed and hypertension.  Has been watching diet and pushing activity and overall feels well      Review of Systems   HENT:  Negative for congestion.    Respiratory:  Negative for cough, chest tightness and shortness of breath.    Cardiovascular:  Negative for chest pain, palpitations and leg swelling.   Gastrointestinal:  Negative for constipation and diarrhea.   Endocrine: Negative for polyuria (Nocturia x 1).   Genitourinary:  Negative for difficulty urinating.   Neurological:  Negative for dizziness and light-headedness.   Psychiatric/Behavioral:  Negative for sleep disturbance.      History reviewed. No pertinent past medical history.  History reviewed. No pertinent surgical history.  History reviewed. No pertinent family history.  Social History     Tobacco Use    Smoking status: Former     Types: Cigarettes    Smokeless tobacco: Current     Types: Chew    Tobacco comments:     quit 9 yrs ago   Vaping Use    Vaping status: Never Used   Substance and Sexual Activity    Alcohol use: No    Drug use: No    Sexual activity: Not Currently     Current Outpatient Medications on File Prior to Visit   Medication Sig    methylPREDNISolone 4 MG tablet therapy pack Use as directed on  "package (Patient not taking: Reported on 4/23/2024)     No Known Allergies    There is no immunization history on file for this patient.  Objective     Blood Pressure 122/78 (BP Location: Left arm, Patient Position: Sitting, Cuff Size: Large)   Pulse 82   Height 5' 8\" (1.727 m)   Weight 107 kg (236 lb)   Oxygen Saturation 93%   Body Mass Index 35.88 kg/m²     Physical Exam  Vitals and nursing note reviewed.   Constitutional:       General: He is not in acute distress.     Appearance: Normal appearance. He is well-developed.   HENT:      Head: Normocephalic and atraumatic.   Eyes:      Conjunctiva/sclera: Conjunctivae normal.   Neck:      Vascular: No carotid bruit.   Cardiovascular:      Rate and Rhythm: Normal rate and regular rhythm.      Heart sounds: No murmur (Rate is 66) heard.  Pulmonary:      Effort: Pulmonary effort is normal. No respiratory distress.      Breath sounds: Normal breath sounds.   Abdominal:      General: Abdomen is flat.      Palpations: Abdomen is soft.      Tenderness: There is no abdominal tenderness.   Musculoskeletal:         General: No swelling.      Cervical back: Neck supple. No tenderness.      Right lower leg: No edema.      Left lower leg: No edema.   Skin:     General: Skin is warm and dry.   Neurological:      Mental Status: He is alert.      Motor: No weakness.      Coordination: Coordination normal.      Gait: Gait normal.   Psychiatric:         Mood and Affect: Mood normal.       Administrative Statements         "

## 2024-06-28 NOTE — ASSESSMENT & PLAN NOTE
A1c is now 6.4 and it is showing good weight loss of 13 pounds.  Would continue to push diet and activity at this time  Lab Results   Component Value Date    HGBA1C 7.1 (A) 03/21/2024

## 2024-06-28 NOTE — PATIENT INSTRUCTIONS
A1c is now down to 6.4 and is lost 13 pounds.  Should just continue watching the diet and pushing the activity and would not recommend medication at this point.  Blood pressure also remains good

## 2024-10-16 ENCOUNTER — VBI (OUTPATIENT)
Dept: ADMINISTRATIVE | Facility: OTHER | Age: 49
End: 2024-10-16

## 2024-10-16 NOTE — TELEPHONE ENCOUNTER
10/16/24 1:31 PM     Chart reviewed for Diabetic Eye Exam ; nothing is submitted to the patient's insurance at this time.     Jenna Weiss MA   [facility]

## 2024-10-24 ENCOUNTER — RA CDI HCC (OUTPATIENT)
Dept: OTHER | Facility: HOSPITAL | Age: 49
End: 2024-10-24

## 2024-10-24 NOTE — PROGRESS NOTES
HCC coding opportunities       Chart reviewed, no opportunity found: CHART REVIEWED, NO OPPORTUNITY FOUND     Patients Insurance     Commercial Insurance: [employer]

## 2024-11-21 ENCOUNTER — OFFICE VISIT (OUTPATIENT)
Dept: FAMILY MEDICINE CLINIC | Facility: CLINIC | Age: 49
End: 2024-11-21
Payer: COMMERCIAL

## 2024-11-21 VITALS
BODY MASS INDEX: 36.53 KG/M2 | SYSTOLIC BLOOD PRESSURE: 122 MMHG | HEIGHT: 68 IN | DIASTOLIC BLOOD PRESSURE: 82 MMHG | WEIGHT: 241 LBS

## 2024-11-21 DIAGNOSIS — E11.9 TYPE 2 DIABETES MELLITUS WITHOUT COMPLICATION, WITHOUT LONG-TERM CURRENT USE OF INSULIN (HCC): Primary | ICD-10-CM

## 2024-11-21 DIAGNOSIS — I10 PRIMARY HYPERTENSION: ICD-10-CM

## 2024-11-21 LAB — SL AMB POCT HEMOGLOBIN AIC: 6.9 (ref ?–6.5)

## 2024-11-21 PROCEDURE — 99213 OFFICE O/P EST LOW 20 MIN: CPT | Performed by: FAMILY MEDICINE

## 2024-11-21 PROCEDURE — 83036 HEMOGLOBIN GLYCOSYLATED A1C: CPT | Performed by: FAMILY MEDICINE

## 2024-11-21 RX ORDER — METFORMIN HYDROCHLORIDE 500 MG/1
500 TABLET, EXTENDED RELEASE ORAL
Qty: 30 TABLET | Refills: 5 | Status: SHIPPED | OUTPATIENT
Start: 2024-11-21

## 2024-11-21 NOTE — PATIENT INSTRUCTIONS
Overall exam today looks good the A1c is slightly lower at 6.9.  Will add metformin XR to be taken 500 mg with evening meal.  Continue to watch diet and try to push walking activity

## 2024-11-21 NOTE — ASSESSMENT & PLAN NOTE
Is watching diet much better.  A1c is slightly down.  Will add metformin  mg with evening meal and push daily walking activity  Lab Results   Component Value Date    HGBA1C 6.9 (A) 11/21/2024       Orders:    POCT hemoglobin A1c    metFORMIN (GLUCOPHAGE-XR) 500 mg 24 hr tablet; Take 1 tablet (500 mg total) by mouth daily with dinner

## 2024-11-21 NOTE — PROGRESS NOTES
Name: Zoltan Damon      : 1975      MRN: [id number]  Encounter Provider: Tulio Marsh MD  Encounter Date: 2024   Encounter department: [facility]    Assessment & Plan  Type 2 diabetes mellitus without complication, without long-term current use of insulin (HCC)  Is watching diet much better.  A1c is slightly down.  Will add metformin  mg with evening meal and push daily walking activity  Lab Results   Component Value Date    HGBA1C 6.9 (A) 2024       Orders:    POCT hemoglobin A1c    metFORMIN (GLUCOPHAGE-XR) 500 mg 24 hr tablet; Take 1 tablet (500 mg total) by mouth daily with dinner    Primary hypertension  Pressure okay today.  Watch salt in diet and push for weight loss            History of Present Illness       Patient has history of diabetes mellitus adult recently diagnosed and hypertension.  Has been watching diet and pushing activity and overall feels well      Review of Systems   HENT:  Negative for congestion.    Eyes:  Negative for visual disturbance.   Respiratory:  Negative for cough, chest tightness and shortness of breath.    Cardiovascular:  Negative for chest pain, palpitations and leg swelling.   Gastrointestinal:  Negative for abdominal pain, constipation and diarrhea.   Endocrine: Negative for polyuria (Nocturia x 0-1).   Genitourinary:  Negative for difficulty urinating.   Allergic/Immunologic: Negative for environmental allergies.   Neurological:  Negative for dizziness and light-headedness.   Psychiatric/Behavioral:  Negative for agitation and sleep disturbance.      History reviewed. No pertinent past medical history.  History reviewed. No pertinent surgical history.  History reviewed. No pertinent family history.  Social History     Tobacco Use    Smoking status: Former     Types: Cigarettes    Smokeless tobacco: Current     Types: Chew    Tobacco comments:     quit 9 yrs ago   Vaping Use    Vaping status: Never Used  "  Substance and Sexual Activity    Alcohol use: No    Drug use: No    Sexual activity: Not Currently     Current Outpatient Medications on File Prior to Visit   Medication Sig    methylPREDNISolone 4 MG tablet therapy pack Use as directed on package (Patient not taking: Reported on 4/23/2024)     No Known Allergies    There is no immunization history on file for this patient.  Objective   Blood Pressure 122/82 (BP Location: Left arm, Patient Position: Sitting, Cuff Size: Large)   Height 5' 8\" (1.727 m)   Weight 109 kg (241 lb)   Body Mass Index 36.64 kg/m²     Physical Exam  Vitals and nursing note reviewed.   Constitutional:       General: He is not in acute distress.     Appearance: He is well-developed. He is obese.   HENT:      Head: Normocephalic and atraumatic.      Nose: Nose normal.   Eyes:      Conjunctiva/sclera: Conjunctivae normal.   Neck:      Vascular: No carotid bruit.   Cardiovascular:      Rate and Rhythm: Normal rate and regular rhythm.      Heart sounds: No murmur (Rate is 66) heard.  Pulmonary:      Effort: Pulmonary effort is normal. No respiratory distress.      Breath sounds: Normal breath sounds.   Abdominal:      General: Abdomen is flat.      Palpations: Abdomen is soft. There is no mass.      Tenderness: There is no abdominal tenderness.   Musculoskeletal:         General: No swelling.      Cervical back: Neck supple. No tenderness.      Right lower leg: No edema.      Left lower leg: No edema.   Lymphadenopathy:      Cervical: No cervical adenopathy.   Skin:     General: Skin is warm and dry.      Capillary Refill: Capillary refill takes less than 2 seconds.      Findings: No rash.   Neurological:      Mental Status: He is alert.      Motor: No weakness.      Coordination: Coordination normal.      Gait: Gait normal.      Deep Tendon Reflexes: Reflexes normal.   Psychiatric:         Mood and Affect: Mood normal.         "

## 2025-01-16 ENCOUNTER — TELEPHONE (OUTPATIENT)
Dept: FAMILY MEDICINE CLINIC | Facility: CLINIC | Age: 50
End: 2025-01-16

## 2025-01-16 NOTE — TELEPHONE ENCOUNTER
Left Vm for pt to call office back due to him being in need of a DM eye exam. If pt calls back and wants to schedule, please schedule him at the [place] location as a nurse visit.

## 2025-02-07 ENCOUNTER — OCCMED (OUTPATIENT)
Dept: URGENT CARE | Facility: CLINIC | Age: 50
End: 2025-02-07
Payer: OTHER MISCELLANEOUS

## 2025-02-07 DIAGNOSIS — S80.812A ABRASION OF LEFT LOWER EXTREMITY, INITIAL ENCOUNTER: Primary | ICD-10-CM

## 2025-02-07 PROCEDURE — 99283 EMERGENCY DEPT VISIT LOW MDM: CPT

## 2025-02-07 PROCEDURE — G0382 LEV 3 HOSP TYPE B ED VISIT: HCPCS

## 2025-02-14 ENCOUNTER — OCCMED (OUTPATIENT)
Dept: URGENT CARE | Facility: CLINIC | Age: 50
End: 2025-02-14
Payer: OTHER MISCELLANEOUS

## 2025-02-14 DIAGNOSIS — S80.812D ABRASION OF LEFT LOWER EXTREMITY, SUBSEQUENT ENCOUNTER: Primary | ICD-10-CM

## 2025-02-14 PROCEDURE — 99213 OFFICE O/P EST LOW 20 MIN: CPT

## 2025-02-20 ENCOUNTER — OCCMED (OUTPATIENT)
Dept: URGENT CARE | Facility: CLINIC | Age: 50
End: 2025-02-20
Payer: OTHER MISCELLANEOUS

## 2025-02-20 DIAGNOSIS — S80.812D ABRASION OF LEFT LOWER EXTREMITY, SUBSEQUENT ENCOUNTER: Primary | ICD-10-CM

## 2025-02-20 PROCEDURE — 99213 OFFICE O/P EST LOW 20 MIN: CPT

## 2025-02-27 ENCOUNTER — OCCMED (OUTPATIENT)
Dept: URGENT CARE | Facility: CLINIC | Age: 50
End: 2025-02-27
Payer: OTHER MISCELLANEOUS

## 2025-02-27 DIAGNOSIS — S80.812D ABRASION OF LEFT LOWER EXTREMITY, SUBSEQUENT ENCOUNTER: Primary | ICD-10-CM

## 2025-02-27 PROCEDURE — 99213 OFFICE O/P EST LOW 20 MIN: CPT

## 2025-03-06 ENCOUNTER — OCCMED (OUTPATIENT)
Dept: URGENT CARE | Facility: CLINIC | Age: 50
End: 2025-03-06
Payer: OTHER MISCELLANEOUS

## 2025-03-06 DIAGNOSIS — S80.812D ABRASION OF LEFT LOWER EXTREMITY, SUBSEQUENT ENCOUNTER: Primary | ICD-10-CM

## 2025-03-06 PROCEDURE — 99213 OFFICE O/P EST LOW 20 MIN: CPT

## 2025-03-12 ENCOUNTER — RA CDI HCC (OUTPATIENT)
Dept: OTHER | Facility: HOSPITAL | Age: 50
End: 2025-03-12

## 2025-03-21 ENCOUNTER — OFFICE VISIT (OUTPATIENT)
Dept: FAMILY MEDICINE CLINIC | Facility: CLINIC | Age: 50
End: 2025-03-21
Payer: COMMERCIAL

## 2025-03-21 VITALS
DIASTOLIC BLOOD PRESSURE: 88 MMHG | OXYGEN SATURATION: 97 % | SYSTOLIC BLOOD PRESSURE: 136 MMHG | WEIGHT: 240.2 LBS | BODY MASS INDEX: 36.4 KG/M2 | HEIGHT: 68 IN | HEART RATE: 70 BPM

## 2025-03-21 DIAGNOSIS — Z00.00 WELLNESS EXAMINATION: Primary | ICD-10-CM

## 2025-03-21 DIAGNOSIS — Z11.4 SCREENING FOR HIV (HUMAN IMMUNODEFICIENCY VIRUS): ICD-10-CM

## 2025-03-21 DIAGNOSIS — Z11.59 NEED FOR HEPATITIS C SCREENING TEST: ICD-10-CM

## 2025-03-21 DIAGNOSIS — I10 PRIMARY HYPERTENSION: ICD-10-CM

## 2025-03-21 DIAGNOSIS — E11.9 TYPE 2 DIABETES MELLITUS WITHOUT COMPLICATION, WITHOUT LONG-TERM CURRENT USE OF INSULIN (HCC): ICD-10-CM

## 2025-03-21 PROCEDURE — 99396 PREV VISIT EST AGE 40-64: CPT | Performed by: FAMILY MEDICINE

## 2025-03-21 RX ORDER — METFORMIN HYDROCHLORIDE 500 MG/1
500 TABLET, EXTENDED RELEASE ORAL
Qty: 30 TABLET | Refills: 5 | Status: SHIPPED | OUTPATIENT
Start: 2025-03-21

## 2025-03-21 NOTE — PROGRESS NOTES
Adult Annual Physical  Name: Zoltan Damon      : 1975      MRN: [id number]  Encounter Provider: Tulio Marsh MD  Encounter Date: 3/21/2025   Encounter department: [facility]    Assessment & Plan  Type 2 diabetes mellitus without complication, without long-term current use of insulin (HCC)  Control has been good but not optimal.  Watch starch in diet and push daily walking activity continue current meds.  Will recheck renal panel and A1c  Lab Results   Component Value Date    HGBA1C 6.9 (A) 2024       Orders:    Renal function panel; Future    Hemoglobin A1C; Future    metFORMIN (GLUCOPHAGE-XR) 500 mg 24 hr tablet; Take 1 tablet (500 mg total) by mouth daily with dinner    Wellness examination    Orders:    Lipid panel; Future    Primary hypertension  Blood pressure borderline.  Watch salt in diet and push for weight lossDiabetic Foot Exam    Diabetic Foot Exam       Preventive Screenings:  - Diabetes Screening: screening not indicated, has diabetes and orders placed  - Cholesterol Screening: orders placed   - Chlamydia Screening: screening not indicated   - Hepatitis C screening: screening not indicated   - HIV screening: screening not indicated   - Colon cancer screening: screening up-to-date   - Lung cancer screening: screening not indicated   - Prostate cancer screening: screening not indicated     Immunizations:  - Immunizations due: Prevnar 20, Tdap and Zoster (Shingrix)    Counseling/Anticipatory Guidance:    - Diet: discussed recommendations for a healthy/well-balanced diet.   - Exercise: the importance of regular exercise/physical activity was discussed. Recommend exercise 3-5 times per week for at least 30 minutes.       Tobacco Cessation Counseling: Tobacco cessation counseling was not provided. The patient is sincerely urged to quit consumption of tobacco. He is not ready to quit tobacco. Medication options not discussed.         History of  Present Illness     Adult Annual Physical:  Patient presents for annual physical.     Diet and Physical Activity:  - Diet/Nutrition: well balanced diet.  - Exercise: walking.    Depression Screening:  - PHQ-2 Score: 0    General Health:  - Sleep: sleeps well.  - Hearing: decreased hearing left ear. 25 dB hearing screen off at 4000 Hz on left ear  - Vision: most recent eye exam < 1 year ago.  - Dental: no dental visits for > 1 year, does not brush teeth regularly and floss regularly.     Health:  - History of STDs: no.   - Urinary symptoms: none.     Advanced Care Planning:  - Has an advanced directive?: no    - Has a durable medical POA?: no    - ACP document given to patient?: no      Review of Systems   Constitutional:  Negative for activity change, appetite change, chills, fatigue, fever and unexpected weight change.   HENT:  Negative for rhinorrhea and trouble swallowing.    Eyes: Negative.  Negative for visual disturbance.   Respiratory:  Negative for apnea, cough, chest tightness and shortness of breath.    Cardiovascular:  Negative for chest pain, palpitations and leg swelling.   Gastrointestinal:  Negative for abdominal distention, abdominal pain, constipation and diarrhea.   Endocrine: Negative for polyuria.   Genitourinary:  Negative for difficulty urinating and hematuria.   Musculoskeletal:  Negative for arthralgias and myalgias.   Skin:  Negative for color change and rash.   Allergic/Immunologic: Negative for environmental allergies.   Neurological:  Negative for dizziness, weakness, light-headedness, numbness and headaches.   Hematological:  Negative for adenopathy.   Psychiatric/Behavioral:  Negative for agitation and sleep disturbance.    All other systems reviewed and are negative.    Diabetic Foot Exam    Patient's shoes and socks removed.    Right Foot/Ankle   Right Foot Inspection  Skin Exam: skin normal and skin intact. No dry skin, no warmth, no callus, no erythema, no maceration, no abnormal  "color, no pre-ulcer, no ulcer and no callus.     Toe Exam:  no right toe deformity    Sensory   Monofilament testing: intact    Vascular  Capillary refills: < 3 seconds  The right DP pulse is 2+. The right PT pulse is 2+.     Left Foot/Ankle  Left Foot Inspection  Skin Exam: skin normal and skin intact. No dry skin, no warmth, no erythema, no maceration, normal color, no pre-ulcer, no ulcer and no callus.     Toe Exam: No left toe deformity.     Sensory   Monofilament testing: intact    Vascular  Capillary refills: < 3 seconds  The left DP pulse is 2+. The left PT pulse is 1+.     Assign Risk Category  No deformity present  No loss of protective sensation  No weak pulses  Risk: 0          Objective   Blood Pressure 136/88 (BP Location: Left arm, Patient Position: Sitting, Cuff Size: Standard)   Pulse 70   Height 5' 8\" (1.727 m)   Weight 109 kg (240 lb 3.2 oz)   Oxygen Saturation 97%   Body Mass Index 36.52 kg/m²     Physical Exam  Cardiovascular:      Pulses: no weak pulses.           Dorsalis pedis pulses are 2+ on the right side and 2+ on the left side.        Posterior tibial pulses are 2+ on the right side and 1+ on the left side.   Musculoskeletal:      Right foot: No deformity.      Left foot: No deformity.        Feet:    Feet:      Right foot:      Protective Sensation: 8 sites tested.  8 sites sensed.      Skin integrity: Skin integrity normal. No ulcer, skin breakdown, erythema, warmth, callus or dry skin.      Left foot:      Protective Sensation: 8 sites tested.  8 sites sensed.      Skin integrity: Skin integrity normal. No ulcer, skin breakdown, erythema, warmth, callus or dry skin.         " and 1+ on the left side.      Heart sounds: Normal heart sounds. No murmur (Rate is 72) heard.  Pulmonary:      Effort: Pulmonary effort is normal.      Breath sounds: Normal breath sounds.   Abdominal:      General: Abdomen is flat. There is no distension.      Palpations: Abdomen is soft. There is no mass.      Tenderness: There is no abdominal tenderness.      Hernia: There is no hernia in the left inguinal area or right inguinal area.   Genitourinary:     Penis: Normal and circumcised.       Testes: Normal.   Musculoskeletal:         General: No swelling.      Cervical back: Normal range of motion and neck supple. No tenderness. No muscular tenderness.      Right lower leg: No edema.      Left lower leg: No edema.      Right foot: No deformity.      Left foot: No deformity.        Feet:    Feet:      Right foot:      Protective Sensation: 8 sites tested.  8 sites sensed.      Skin integrity: Skin integrity normal. No ulcer, skin breakdown, erythema, warmth, callus or dry skin.      Left foot:      Protective Sensation: 8 sites tested.  8 sites sensed.      Skin integrity: Skin integrity normal. No ulcer, skin breakdown, erythema, warmth, callus or dry skin.   Lymphadenopathy:      Cervical: No cervical adenopathy.   Skin:     General: Skin is warm and dry.      Capillary Refill: Capillary refill takes 2 to 3 seconds.      Findings: No rash.   Neurological:      General: No focal deficit present.      Mental Status: He is alert and oriented to person, place, and time.      Cranial Nerves: No cranial nerve deficit.      Sensory: No sensory deficit.      Motor: No weakness.      Coordination: Coordination normal.      Gait: Gait normal.      Deep Tendon Reflexes: Reflexes normal.   Psychiatric:         Mood and Affect: Mood normal.         Behavior: Behavior normal.         Thought Content: Thought content normal.         Judgment: Judgment normal.

## 2025-03-21 NOTE — ASSESSMENT & PLAN NOTE
Control has been good but not optimal.  Watch starch in diet and push daily walking activity continue current meds.  Will recheck renal panel and A1c  Lab Results   Component Value Date    HGBA1C 6.9 (A) 11/21/2024       Orders:    Renal function panel; Future    Hemoglobin A1C; Future    metFORMIN (GLUCOPHAGE-XR) 500 mg 24 hr tablet; Take 1 tablet (500 mg total) by mouth daily with dinner

## 2025-03-21 NOTE — PATIENT INSTRUCTIONS
Overall exam today looks good.  Continue to push daily physical activity and watch the starch in the diet.  Is going to try to get the report from the colonoscopy sent.  Will check renal panel lipid panel and A1c.  Continue current meds and may take the metformin in the morning.  Continue follow-up with routine eye care and have reports forwarded to office

## 2025-03-22 NOTE — ASSESSMENT & PLAN NOTE
Blood pressure borderline.  Watch salt in diet and push for weight lossDiabetic Foot Exam    Diabetic Foot Exam

## 2025-04-03 ENCOUNTER — TELEPHONE (OUTPATIENT)
Dept: FAMILY MEDICINE CLINIC | Facility: CLINIC | Age: 50
End: 2025-04-03

## 2025-04-03 NOTE — TELEPHONE ENCOUNTER
Left Vm for pt to call office back due to him being in need of a DM eye exam. If pt calls back and wants to schedule, please schedule him at the [place] location as a nurse visit.

## 2025-07-14 ENCOUNTER — VBI (OUTPATIENT)
Dept: ADMINISTRATIVE | Facility: OTHER | Age: 50
End: 2025-07-14

## 2025-07-14 NOTE — TELEPHONE ENCOUNTER
07/14/25 9:25 AM     Chart reviewed for   [facility] - Eye Exam    ; nothing is submitted to the patient's insurance at this time.     ANGELINA RANGEL MA   [facility]

## 2025-07-15 ENCOUNTER — RA CDI HCC (OUTPATIENT)
Dept: OTHER | Facility: HOSPITAL | Age: 50
End: 2025-07-15

## 2025-07-21 ENCOUNTER — TELEPHONE (OUTPATIENT)
Age: 50
End: 2025-07-21

## 2025-08-11 ENCOUNTER — TELEPHONE (OUTPATIENT)
Age: 50
End: 2025-08-11

## 2025-08-19 ENCOUNTER — TELEPHONE (OUTPATIENT)
Age: 50
End: 2025-08-19